# Patient Record
Sex: FEMALE | Race: WHITE
[De-identification: names, ages, dates, MRNs, and addresses within clinical notes are randomized per-mention and may not be internally consistent; named-entity substitution may affect disease eponyms.]

---

## 2017-07-30 ENCOUNTER — HOSPITAL ENCOUNTER (EMERGENCY)
Dept: HOSPITAL 41 - JD.ED | Age: 82
Discharge: HOME | End: 2017-07-30
Payer: MEDICARE

## 2017-07-30 VITALS — DIASTOLIC BLOOD PRESSURE: 86 MMHG | SYSTOLIC BLOOD PRESSURE: 170 MMHG

## 2017-07-30 DIAGNOSIS — E11.9: ICD-10-CM

## 2017-07-30 DIAGNOSIS — Z79.899: ICD-10-CM

## 2017-07-30 DIAGNOSIS — Z88.0: ICD-10-CM

## 2017-07-30 DIAGNOSIS — E66.9: ICD-10-CM

## 2017-07-30 DIAGNOSIS — Z79.84: ICD-10-CM

## 2017-07-30 DIAGNOSIS — R00.1: Primary | ICD-10-CM

## 2017-07-30 DIAGNOSIS — K21.9: ICD-10-CM

## 2017-07-30 DIAGNOSIS — Z88.5: ICD-10-CM

## 2017-07-30 DIAGNOSIS — I10: ICD-10-CM

## 2017-07-30 DIAGNOSIS — E78.00: ICD-10-CM

## 2017-07-30 DIAGNOSIS — F02.80: ICD-10-CM

## 2017-07-30 DIAGNOSIS — G30.1: ICD-10-CM

## 2017-07-30 DIAGNOSIS — E03.9: ICD-10-CM

## 2017-07-30 PROCEDURE — 93005 ELECTROCARDIOGRAM TRACING: CPT

## 2017-07-30 PROCEDURE — 96360 HYDRATION IV INFUSION INIT: CPT

## 2017-07-30 PROCEDURE — 99284 EMERGENCY DEPT VISIT MOD MDM: CPT

## 2017-07-30 PROCEDURE — P9612 CATHETERIZE FOR URINE SPEC: HCPCS

## 2017-07-30 PROCEDURE — 71020: CPT

## 2017-07-30 PROCEDURE — 84443 ASSAY THYROID STIM HORMONE: CPT

## 2017-07-30 PROCEDURE — 80053 COMPREHEN METABOLIC PANEL: CPT

## 2017-07-30 PROCEDURE — 85025 COMPLETE CBC W/AUTO DIFF WBC: CPT

## 2017-07-30 PROCEDURE — 86140 C-REACTIVE PROTEIN: CPT

## 2017-07-30 PROCEDURE — 51798 US URINE CAPACITY MEASURE: CPT

## 2017-07-30 PROCEDURE — 36415 COLL VENOUS BLD VENIPUNCTURE: CPT

## 2017-07-30 PROCEDURE — 96361 HYDRATE IV INFUSION ADD-ON: CPT

## 2017-07-30 PROCEDURE — 87040 BLOOD CULTURE FOR BACTERIA: CPT

## 2017-07-30 PROCEDURE — 81001 URINALYSIS AUTO W/SCOPE: CPT

## 2017-07-30 PROCEDURE — 84484 ASSAY OF TROPONIN QUANT: CPT

## 2017-07-31 NOTE — CR
Chest:  Two views of the chest were obtained.

 

Comparison: No previous study.

 

Right and left perihilar markings are slightly increased.  Difficult 

to exclude combination of atelectasis and bronchitis.  Lungs otherwise

 are clear.  Heart size is normal.  Tortuous thoracic aorta is seen.  

Surgical clips are seen along the right upper mediastinum.

 

Impression:

1.  Possible atelectasis and perihilar bronchitis.

2.  Surgical clips along the right side of the upper mediastinum.

3.  Other incidental findings.

 

Diagnostic code #3

## 2017-08-06 ENCOUNTER — HOSPITAL ENCOUNTER (EMERGENCY)
Dept: HOSPITAL 41 - JD.ED | Age: 82
Discharge: NURSING, REHAB OR LONG-TERM CARE | End: 2017-08-06
Payer: MEDICARE

## 2017-08-06 VITALS — SYSTOLIC BLOOD PRESSURE: 144 MMHG | DIASTOLIC BLOOD PRESSURE: 79 MMHG

## 2017-08-06 DIAGNOSIS — E66.9: ICD-10-CM
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
## 2017-08-06 DIAGNOSIS — I10: ICD-10-CM

## 2017-08-06 DIAGNOSIS — E03.9: ICD-10-CM

## 2017-08-06 DIAGNOSIS — E11.9: ICD-10-CM

## 2017-08-06 DIAGNOSIS — Z88.0: ICD-10-CM

## 2017-08-06 DIAGNOSIS — Z79.899: ICD-10-CM

## 2017-08-06 DIAGNOSIS — E78.00: ICD-10-CM

## 2017-08-06 DIAGNOSIS — K21.9: ICD-10-CM

## 2017-08-06 DIAGNOSIS — Z88.5: ICD-10-CM

## 2017-08-06 DIAGNOSIS — S01.01XA: Primary | ICD-10-CM

## 2017-08-06 NOTE — CT
Head CT

 

Technique: Multiple axial sections through the brain were obtained.  

Intravenous contrast was not utilized.

 

Comparison: No previous study.

 

Findings: Soft tissue hematoma is seen within the posterior left side 

of the scalp.

 

Ventricles along with basal cisterns and sulci over convexities are 

mildly prominent.  Diminished density is noted within the 

periventricular and subcortical white matter compatible with small 

vessel ischemic demyelination change.  No other abnormal parenchymal 

densities are seen.  No evidence of intracranial hemorrhage.  No 

midline shift or mass effect is seen.

 

Slight atherosclerotic change is noted within the vertebral vessels 

and within the carotid siphon.

 

Bone window settings were reviewed which shows no acute calvarial 

abnormality.  Visualized sinuses are clear.

 

Impression:

1.  Scalp hematoma within the posterior left scalp.

2.  Senescent change as described above.

3.  No acute intracranial abnormality is identified.

 

Diagnostic code #2

## 2017-08-06 NOTE — EDM.PDOC
ED HPI GENERAL MEDICAL PROBLEM





- General


Chief Complaint: Laceration


Stated Complaint: [facility]


Time Seen by Provider: 08/06/17 17:24


Source of Information: Reports: Patient, EMS


History Limitations: Reports: No Limitations





- History of Present Illness


INITIAL COMMENTS - FREE TEXT/NARRATIVE: 





81-year-old female presents from the [facility] by ambulance.


She had unwitnessed fall and was appreciated to have a laceration to her 

occipital scalp. Patient has no recollection of why when or where this 

occurred. She usually walks with aid of a walker. She denies any other 

injuries. Unclear when her last tetanus toxoid was.


Onset: Today


Onset Date: 08/06/17


Onset Time: 16:00


Duration: Minutes:


Location: Reports: Head (Has a 2 cm laceration occipital scalp.)


Quality: Reports: Other (Patient is asymptomatic. Patient has severe underlying 

organic brain disease and really had no recollection of how or where she fell 

or how she may have gotten hurt. She is not distressed by it at all.)


Severity: Moderate


Improves with: Reports: None


Worsens with: Reports: None


Context: Reports: Other (Unwitnessed fall in nursing home. Usually gets around 

with aid of a walker.)


Associated Symptoms: Reports: Confusion ( Has severe underlying dementia 

chronically from severe dementia )


Treatments PTA: Reports: Other (see below) (None.)





- Related Data


 Allergies











Allergy/AdvReac Type Severity Reaction Status Date / Time


 


atorvastatin Allergy  Other Verified 08/06/17 17:10


 


codeine Allergy  Other Verified 08/06/17 17:10


 


losartan Allergy  Other Verified 08/06/17 17:10


 


morphine Allergy  Other Verified 08/06/17 17:10


 


Penicillins Allergy  Other Verified 08/06/17 17:10











Home Meds: 


 Home Meds





Acetaminophen [Tylenol] 325 mg PO Q4H PRN 07/30/17 [History]


Betamethasone/Clotrimazole [Lotrisone] 1 applic TOP BID PRN 07/30/17 [History]


Bisacodyl [Dulcolax] 10 mg RECTAL DAILY PRN 07/30/17 [History]


Brimonidine Tartrate [Alphagan P] 1 drop EYELF BID 07/30/17 [History]


Calcium Carbonate [Tums] 200 mg PO ASDIRECTED PRN 07/30/17 [History]


Colesevelam HCl [Welchol] 3 tab PO BID 07/30/17 [History]


Compression Socks, Medium [Futuro Restoring] 1 applic TOP ASDIRECTED 07/30/17 [

History]


Dorzolamide [Trusopt 2% Ophth Soln] 10 ml EYELF BID 07/30/17 [History]


Edetate Disodium/PEG [Eyescrub Cleansing Pads] 1 each TP DAILY 07/30/17 [History

]


Glycerin/Propylene Glycol [Artificial Tears Drops] 1 drop OP QID 07/30/17 [

History]


Levothyroxine 137 mcg PO ACBREAKFAST 07/30/17 [History]


OLANZapine [Olanzapine] 7.5 mg PO DAILY 07/30/17 [History]


Polyethylene Glycol 3350 [MiraLAX] 17 gm PO DAILY 07/30/17 [History]


Sennosides [Senna] 8.6 mg PO DAILY PRN 07/30/17 [History]


Timolol Maleate [Timoptic 0.25% Ophth Soln] 5 ml EYELF BID 07/30/17 [History]


guaiFENesin/Dextromethorphan [Tussin DM Cough & Chest] 5 ml PO Q4H PRN 07/30/17 

[History]











Past Medical History


HEENT History: Reports: Glaucoma, Other (See Below)


Other HEENT History: dry eyes syndrome


Cardiovascular History: Reports: High Cholesterol, Hypertension


Gastrointestinal History: Reports: Chronic Constipation, GERD


Genitourinary History: Reports: Renal Disease, Other (See Below)


Other Genitourinary History: hx hyperglycemic--hyperosmolar coma ([facility])


Musculoskeletal History: Reports: Gout


Other Musculoskeletal History: uses walker


Neurological History: Reports: Alzheimers Disease, Other (See Below)


Other Neuro History: encephalopathy, AMS


Psychiatric History: Reports: Alzheimers Disease, Schizophrenia, Other (See 

Below)


Other Psychiatric History: delirium


Endocrine/Metabolic History: Reports: Diabetes, Type II, Hypothyroidism, Obesity

/BMI 30+





- Past Surgical History


Other Endocrine Surgeries/Procedures: thyroid surgery





Social & Family History





- Family History


Family Medical History: Noncontributory





- Tobacco Use


Smoking Status *Q: Unknown Ever Smoked


Second Hand Smoke Exposure: No





- Caffeine Use


Caffeine Use: Reports: Coffee





- Recreational Drug Use


Recreational Drug Use: No





- Living Situation & Occupation


Living situation: Reports: Extended Care Facility (Currently living in the 

[facility])


Occupation: Retired





ED ROS GENERAL





- Review of Systems


Review Of Systems: Unable To Obtain (Unable to obtain from the patient due to 

her underlying severe organic brain disease.)





ED EXAM, SKIN/RASH


Exam: See Below


Exam Limited By: Altered Mental Status (Severe dementia.)


General Appearance: Alert, No Apparent Distress


Eye Exam: Bilateral Eye: Normal Inspection, PERRL


Ears: Normal External Exam, Normal TMs


Nose: Normal Inspection


Throat/Mouth: Normal Inspection, Normal Oropharynx, Other.  No: Normal Teeth


Head: Other (No injuries to her tongue is a 2 cm stellate laceration to the 

superior aspect of the occipital scalp more towards the left than the right.)


Neck: Normal Inspection, Supple, Non-Tender, Full Range of Motion.  No: Carotid 

Bruit, Lymphadenopathy (L), Lymphadenopathy (R), Thyromegaly


Respiratory/Chest: No Respiratory Distress, Lungs Clear, Normal Breath Sounds, 

Chest Non-Tender


Cardiovascular: Regular Rate, Rhythm, No Edema, No Gallop, No Murmur, No Rub.  

No: Normal Peripheral Pulses


Peripheral Pulses: 2+: Posterior Tibial (L), Posterior Tibial (R), Dorsalis 

Pedis (L), Dorsalis Pedis (R)


GI/Abdominal: Normal Bowel Sounds, Soft, Non-Tender, No Organomegaly


Back Exam: Normal Inspection, Full Range of Motion, Other (No bruises 

contusions or abrasions identified. No vertebral tenderness identified.).  No: 

CVA Tenderness (L), CVA Tenderness (R)


Extremities: Normal Inspection, Normal Range of Motion, Non-Tender, No Pedal 

Edema, Other (No evidence of injuries to the wrists elbows or shoulders. No 

injuries to the hips or knees identified.)


Neurological: CN II-XII Intact, No Motor/Sensory Deficits.  No: Oriented, 

Normal Cognition, Normal Gait


Psychiatric: Normal Affect, Normal Mood


Skin: Warm, Dry, Intact, Normal Color, No Rash





ED SKIN PROCEDURES





- Laceration/Wound Repair


  ** Upper Posterior Occipital Head


Lac/Wound length In cm: 2.0


Appearance: Subcutaneous, Stellate


Distal NVT: Neuro & Vascular Intact


Anesthetic Type: Local


Local Anesthesia - Lidocaine (Xylocaine): 1% Plain


Local Anesthetic Volume: 2cc


Skin Prep: Saline


Closed with: Sutures


Suture Size: 4-0


# of Sutures: 4


Suture Type: Prolene, Interrupted, Simple





EKG INTERPRETATION


EKG Date: 08/06/17


Time: 18:45


Rhythm: NSR


Rate (Beats/Min): 85


Axis: Normal


P-Wave: Present


QRS: Other (Initial poor R-wave progression . Occasional multifocal PVCs 

appreciated)


ST-T: Normal (no signs of ischemia)


QT: Prolonged


Comparison: No Change (Minimally prolonged)





Course





- Vital Signs


Last Recorded V/S: 


 Last Vital Signs











Temp  36.3 C   08/06/17 17:03


 


Pulse  86   08/06/17 17:03


 


Resp  16   08/06/17 17:03


 


BP  144/79 H  08/06/17 17:03


 


Pulse Ox  95   08/06/17 17:03














- Orders/Labs/Meds


Orders: 


 Active Orders 24 hr











 Category Date Time Status


 


 EKG Documentation Completion [RC] STAT Care  08/06/17 17:31 Active











Labs: 


 Laboratory Tests











  08/06/17 08/06/17 Range/Units





  18:07 18:07 


 


WBC  9.97   (3.98-10.04)  K/mm3


 


RBC  4.08   (3.98-5.22)  M/mm3


 


Hgb  12.7   (11.2-15.7)  gm/L


 


Hct  37.5   (34.1-44.9)  %


 


MCV  91.9   (79.4-94.8)  fl


 


MCH  31.1   (25.6-32.2)  pg


 


MCHC  33.9   (32.2-35.5)  g/dl


 


RDW Std Deviation  46.5 H   (36.4-46.3)  fL


 


Plt Count  204   (182-369)  K/mm3


 


MPV  10.8   (9.4-12.3)  fl


 


Neutrophils % (Manual)  75 H   (40-60)  %


 


Band Neutrophils %  2   (0-10)  %


 


Lymphocytes % (Manual)  20   (20-40)  %


 


Atypical Lymphs %  0   %


 


Monocytes % (Manual)  2   (2-10)  %


 


Eosinophils % (Manual)  1   (0.7-5.8)  %


 


Basophils % (Manual)  0 L   (0.1-1.2)  


 


Platelet Estimate  Adequate   


 


Anisocytosis  1+ slight   


 


Ovalocytes  1+ slight   


 


RBC Morph Comment  Not Reportable   


 


Sodium   141  (136-145)  mEq/L


 


Potassium   4.1  (3.5-5.1)  mEq/L


 


Chloride   107  ()  mEq/L


 


Carbon Dioxide   26  (21-32)  mEq/L


 


Anion Gap   12.1  (5-15)  


 


BUN   23 H  (7-18)  mg/dL


 


Creatinine   1.4 H  (0.55-1.02)  mg/dL


 


Est Cr Clr Drug Dosing   29.32  mL/min


 


Estimated GFR (MDRD)   36  (>60)  mL/min


 


BUN/Creatinine Ratio   16.4  (14-18)  


 


Glucose   96  ()  mg/dL


 


Calcium   9.6  (8.5-10.1)  mg/dL


 


Total Bilirubin   0.7  (0.2-1.0)  mg/dL


 


AST   19  (15-37)  U/L


 


ALT   21  (14-59)  U/L


 


Alkaline Phosphatase   37 L  ()  U/L


 


CK-MB (CK-2)   0.8  (0-3.6)  ng/ml


 


Troponin I   < 0.017  (0.00-0.056)  ng/mL


 


Total Protein   6.8  (6.4-8.2)  g/dl


 


Albumin   3.6  (3.4-5.0)  g/dl


 


Globulin   3.2  gm/dL


 


Albumin/Globulin Ratio   1.1  (1-2)  











Meds: 


Medications














Discontinued Medications














Generic Name Dose Route Start Last Admin





  Trade Name Freq  PRN Reason Stop Dose Admin


 


Lidocaine HCl  10 ml  08/06/17 17:24  08/06/17 17:44





  Xylocaine 1%  INJECT  08/06/17 17:25  10 ml





  ONETIME ONE   Administration














- Radiology Interpretation


Free Text/Narrative:: 





81-year-old female brought to the ED per [place]  [facility] after she 

experienced an unwitnessed fall at the [facility]. She was 

identified to have hit her head and suffered a laceration to the occipital 

scalp. She usually walks on her own volition with aid of a walker. Again the 

details of where and how she fell or unclear. She did not appear to be in any 

distress upon arrival. Patient is not does have severe organic brain disease.





- Re-Assessments/Exams


Free Text/Narrative Re-Assessment/Exam: 





08/06/17 19:30: CT of the head reveals diminished density within the 

periventricular and subcortical white matter compatible with small vessel 

ischemic changes. No other parenchymal abnormalities were identified. There is 

no evidence of intracranial hemorrhage or fracture. There is slight 

atherosclerotic changes noted within the vertebral vessels and within the 

carotid siphon. Scalp hematoma appreciated within the posterior left scalp.





08/06/17 20:30:  Laceration was repaired occipital scalp using 4-0 Prolene 4 

sutures. Wound will be cleansed daily with soap and water at the care center. 

Sutures will need to be removed in 10 days' time. She was discharged home in 

the care of her family.








Departure





- Departure


Time of Disposition: 20:36


Disposition: DC/Tfer to [facility]


Condition: Fair


Clinical Impression: 


Occipital scalp laceration


Qualifiers:


 Encounter type: initial encounter Qualified Code(s): S01.01XA - Laceration 

without foreign body of scalp, initial encounter





Fall as cause of accidental injury at home as place of occurrence


Qualifiers:


 Encounter type: initial encounter Qualified Code(s): W19.XXXA - Unspecified 

fall, initial encounter








- Discharge Information


Instructions:  Laceration Care, Adult


Referrals: 


Alli Winston MD [Primary Care Provider] - 


Forms:  ED Department Discharge


Additional Instructions: 


Evaluation the emergency room today after a fall at home that was unwitnessed. 

Obvious injury to the occipital scalp with a 2 cm laceration to the superior 

occipital scalp identified. CT of the brain was carried out and shows marked 

degenerative changes with small vessel ischemic changes but no signs of stroke 

or intracranial bleeding or mid line shift or mass effect. Lab work was done 

and also was all within normal limits. There was no metabolic abnormalities 

that would cause muscle weakness and or fall. Laceration was repaired with 4 4-

0 Prolene sutures. Treatment at home is daily cleanse the wound with soap and 

water showering is okay. Then apply topical anabolic such as Polysporin or 

bacitracin once daily. Sutures need to be removed in 10 days' time.





- My Orders


Last 24 Hours: 


My Active Orders





08/06/17 17:31


EKG Documentation Completion [RC] STAT 














- Assessment/Plan


Last 24 Hours: 


My Active Orders





08/06/17 17:31


EKG Documentation Completion [RC] STAT

## 2017-08-29 ENCOUNTER — HOSPITAL ENCOUNTER (EMERGENCY)
Dept: HOSPITAL 41 - JD.ED | Age: 82
Discharge: HOME | End: 2017-08-29
Payer: MEDICARE

## 2017-08-29 VITALS — DIASTOLIC BLOOD PRESSURE: 57 MMHG | SYSTOLIC BLOOD PRESSURE: 105 MMHG

## 2017-08-29 DIAGNOSIS — E11.9: ICD-10-CM

## 2017-08-29 DIAGNOSIS — E78.00: ICD-10-CM

## 2017-08-29 DIAGNOSIS — Z79.84: ICD-10-CM

## 2017-08-29 DIAGNOSIS — E66.9: ICD-10-CM

## 2017-08-29 DIAGNOSIS — Z88.8: ICD-10-CM

## 2017-08-29 DIAGNOSIS — I10: ICD-10-CM

## 2017-08-29 DIAGNOSIS — E03.9: ICD-10-CM

## 2017-08-29 DIAGNOSIS — Z88.0: ICD-10-CM

## 2017-08-29 DIAGNOSIS — G30.9: ICD-10-CM

## 2017-08-29 DIAGNOSIS — F02.80: ICD-10-CM

## 2017-08-29 DIAGNOSIS — Z79.899: ICD-10-CM

## 2017-08-29 DIAGNOSIS — Z88.5: ICD-10-CM

## 2017-08-29 DIAGNOSIS — S02.2XXA: Primary | ICD-10-CM

## 2017-08-29 NOTE — CT
CT cervical spine

 

Technique: Multiple axial sections were obtained from above C1 

inferiorly to the top of T2.  Reconstructed sagittal and coronal 

images were reviewed.

 

Comparison: No previous cervical spine imaging.

 

Findings: Mastoid sinuses that are seen appear clear.  Middle ear 

cavities are also clear.  Posterior skull base is intact.

 

Degenerative change is noted between the dens and anterior arch of C1.

  Mild disc space narrowing is noted at C4-C5 and C5-C6 with posterior

 spurring seen at C5-C6.  Anterior osteophytes are noted at C3-C4, 

C4-C5, C5-C6 and C6-C7.  Mild scattered degenerative change is seen 

within the apophyseal joints.  Mild right-sided neural foraminal 

stenosis is noted at C4-C5.  Moderate to severe bilateral neural 

foraminal stenosis is noted at C5-C6.  Bony defect is seen within the 

posterior lamina at C6 presumably due to previous surgery.  Surgical 

clips are seen at the base of the right neck.  Bony central canal 

stenosis is seen.

 

No acute fracture is appreciated.  No abnormal subluxation is seen.

 

Slight degenerative spurring is noted within the uncovertebral joints 

at C4-C5 and C5-C6.

 

Impression:

1.  Degenerative change as noted above.

2.  Defect within the posterior lamina of C6 on the right side most 

likely due to previous cervical spine surgery.  Please correlate.

3.  Nothing acute is identified on CT study of the cervical spine.

 

Diagnostic code #2

## 2017-08-29 NOTE — EDM.PDOC
ED HPI GENERAL MEDICAL PROBLEM





- General


Chief Complaint: Trauma


Stated Complaint: [facility]


Time Seen by Provider: 08/29/17 07:20


Source of Information: Reports: Patient, EMS, Nursing Home Records


History Limitations: Reports: Altered Mental Status ( hasdementia.)





- History of Present Illness


INITIAL COMMENTS - FREE TEXT/NARRATIVE: 





81-year-old female presents to the ED for evaluation of a fall that was 

unwitnessed. She was found on the floor of her room this morning. It's unclear 

by history whether she walks on her own volition are usually uses a walker or 

wheelchair. She has dementia and was not able to answer all questions. She 

denies much in the way of pain other than in her nose. Nose is obviously very 

swollen and suspect to be fractured. No active bleeding is occurring at this 

time. She has a slight abrasion to her right forehead above the eyebrow. She's 

had previous cervical spine surgery but seems to have full range of motion of 

her neck.


Onset: Today, Unknown/Unsure


Duration: Hour(s): (Unsure what time she may have fallen she was found the 

floor per room this morning.)


Location: Reports: Head, Face, Neck


Quality: Reports: Other


Severity: Moderate (She does not complain of any pain or discomfort.)


Improves with: Reports: None


Worsens with: Reports: None


Context: Reports: Trauma


Associated Symptoms: Reports: No Other Symptoms, Confusion (Chronic confusion 

due to dementia.), Other (Patient answers negative to all questions in regards 

to injuries she may have sustained.)


Treatments PTA: Reports: Other (see below) (None.)





- Related Data


 Allergies











Allergy/AdvReac Type Severity Reaction Status Date / Time


 


atorvastatin Allergy  Other Verified 08/29/17 07:20


 


codeine Allergy  Other Verified 08/29/17 07:20


 


losartan Allergy  Other Verified 08/29/17 07:20


 


morphine Allergy  Other Verified 08/29/17 07:20


 


Penicillins Allergy  Other Verified 08/29/17 07:20











Home Meds: 


 Home Meds





Acetaminophen [Tylenol] 325 mg PO Q4H PRN 07/30/17 [History]


Calcium Carbonate [Tums] 200 mg PO ASDIRECTED PRN 07/30/17 [History]


Levothyroxine 150 mcg PO ACBREAKFAST 07/30/17 [History]


Sennosides [Senna] 8.6 mg PO DAILY PRN 07/30/17 [History]


Timolol Maleate [Timoptic 0.25% Ophth Soln] 1 drop EYELF BID 07/30/17 [History]


Bisacodyl [Dulcolax] 10 mg RECTAL ASDIRECTED PRN 08/29/17 [History]


Brimonidine Tartrate [Alphagan P 0.1% Ophth Soln] 1 drop EYEBOTH BID 08/29/17 [

History]


[place]/Min Oil/Desire/Wool Alcoh [Eucerin Creme] 1 applic EARBOTH BID PRN 08/29/ 17 [History]


Colesevelam [Welchol] 3 tab PO BID 08/29/17 [History]


Dextran 70/Hypromellose [Artificial Tears] 1 drop EYEBOTH QID 08/29/17 [History]


Dorzolamide [Trusopt 2% Ophth Soln] 1 drop EYELF BID 08/29/17 [History]


Latanoprost [Xalatan 0.005% Ophth Soln] 1 drop EYELF BEDTIME 08/29/17 [History]


Metoprolol Tartrate [Lopressor] 50 mg PO BID 08/29/17 [History]


OLANZapine [Olanzapine] 15 mg PO BEDTIME 08/29/17 [History]


guaiFENesin/Dextromethorphan [Safetussin DM] 5 ml PO Q4H PRN 08/29/17 [History]


metFORMIN [Glucophage] 500 mg PO BID 08/29/17 [History]











Past Medical History


HEENT History: Reports: Glaucoma, Other (See Below)


Other HEENT History: dry eyes syndrome


Cardiovascular History: Reports: High Cholesterol, Hypertension


Gastrointestinal History: Reports: Chronic Constipation, GERD


Genitourinary History: Reports: Renal Disease, Other (See Below)


Other Genitourinary History: hx hyperglycemic--hyperosmolar coma ([facility])


Musculoskeletal History: Reports: Gout


Other Musculoskeletal History: uses walker


Neurological History: Reports: Alzheimers Disease, Other (See Below)


Other Neuro History: encephalopathy, AMS


Psychiatric History: Reports: Alzheimers Disease, Schizophrenia, Other (See 

Below)


Other Psychiatric History: delirium


Endocrine/Metabolic History: Reports: Diabetes, Type II, Hypothyroidism, Obesity

/BMI 30+





- Past Surgical History


Other Endocrine Surgeries/Procedures: thyroid surgery





Social & Family History





- Family History


Family Medical History: Noncontributory





- Tobacco Use


Smoking Status *Q: Unknown Ever Smoked


Second Hand Smoke Exposure: No





- Caffeine Use


Caffeine Use: Reports: Coffee





- Recreational Drug Use


Recreational Drug Use: No





- Living Situation & Occupation


Living situation: Reports: Extended Care Facility (Currently living in the 

[facility])


Occupation: Retired





Review of Systems





- Review of Systems


Review Of Systems: Unable To Obtain (Due to her dementia.)





ED EXAM, GENERAL





- Physical Exam


Exam: See Below


Exam Limited By: Altered Mental Status (Organic brain disease with a few verbal 

comments but not does not answer most questions.)


General Appearance: No Apparent Distress (Appears somewhat tired.)


Eye Exam: Bilateral Eye: Normal Inspection, PERRL


Ears: Normal External Exam, Normal TMs


Nose: Other (The nose is markedly swollen worse a little bit on the right side 

as compared to the left. There is dried blood in both nasal canals. Due to her 

positioning was difficult to ascertain full inspection of the right naris and 

it will be repeated after she gets back from CT. Wish to be certain that there 

is no nasal septal hematoma. Active bleeding is occurring at this time.)


Throat/Mouth: Normal Inspection, Normal Lips, Normal Oropharynx, Other


Head: Atraumatic, Normocephalic (Wearing dentures and there is no blood in the 

posterior oropharynx.), Other (Slight a superficial scratch above her right 

eyebrow forehead area.)


Neck: Normal Inspection, Non-Tender, Full Range of Motion, Other (Well-healed 

midline scar over the lower cervical spine and upper thoracic spine.)


Respiratory/Chest: No Respiratory Distress, Lungs Clear, Normal Breath Sounds, 

No Accessory Muscle Use, Decreased Breath Sounds (Does not take very good deep 

breath. Decreased breath sounds lower 30% of lung fields.), Other


Cardiovascular: Normal Peripheral Pulses (No obvious injuries to the posterior 

or anterior thorax ribs or sternum. Clavicles are intact.), Regular Rate, Rhythm

, No Gallop, No Murmur, No Rub


Peripheral Pulses: 1+: Posterior Tibial (L), Posterior Tibial (R), Dorsalis 

Pedis (L), Dorsalis Pedis (R)


GI/Abdominal: Normal Bowel Sounds, Soft, Non-Tender, No Organomegaly


Back Exam: Normal Inspection, Full Range of Motion.  No: CVA Tenderness (L), 

CVA Tenderness (R)


Extremities: Normal Inspection, Normal Range of Motion, Non-Tender, Normal 

Capillary Refill, Other (There is evidence of osteophytic changes in both knees 

but full range of motion appreciated no contusions or abrasions to the knees)


Neurological: No Motor/Sensory Deficits.  No: Oriented, CN II-XII Intact, 

Normal Cognition


Psychiatric: Flat Affect


Skin Exam: Warm, Dry, Intact, Normal Color, No Rash





Course





- Vital Signs


Last Recorded V/S: 


 Last Vital Signs











Temp  36.5 C   08/29/17 07:18


 


Pulse  70   08/29/17 07:18


 


Resp  16   08/29/17 07:18


 


BP  127/75   08/29/17 07:18


 


Pulse Ox  98   08/29/17 07:18














- Orders/Labs/Meds


Orders: 


 Active Orders 24 hr











 Category Date Time Status


 


 Cervical Spine wo Cont [CT] Stat Exams  08/29/17 07:30 Taken


 


 Maxillofacial w/o CM [Max Facial Sinus wo Cont] [CT] Exams  08/29/17 07:27 

Taken





 Stat   














- Radiology Interpretation


Free Text/Narrative:: 





81-year-old female presents to the ED after sustaining a fall sometime during 

the night and [facility]. She has obvious facial injuries 

particularly to her nose. No other injuries were identified on complete exam. 

She is in sinus rhythm. She is not appear to be in failure. Plan maxillofacial 

CT to be done. There are no open wounds that would demand tetanus toxoid at 

this time.





- Re-Assessments/Exams


Free Text/Narrative Re-Assessment/Exam: 





08/29/17 08:33 CT of the head reveals no intracranial bleeding or mass effect. 

There is marked dementia changes with small vessel ischemic changes in both 

basal ganglia. Diminished density noted within portions of the periventricular 

white matter and subcortical white matter compared compatible with small vessel 

ischemic demyelination changes. Soft tissue swelling noted of the upper left 

parietal scalp. This was seen on prior study but is diminished in amount. She 

does have mild diffuse sinus disease slightly worsened from previous 

examination. CT cervical spine reveals advanced degenerative changes with no 

fractures identified. There are osteophytes both posterior and anteriorly with 

degenerative disc disease. CT of the maxillofacial bones reveals a fracture of 

the nasal septum and vomer. There is a deviation of the nasal septum to the 

right side. No other facial fractures are identified. This will be treated 

conservatively.








Departure





- Departure


Time of Disposition: 08:35


Disposition: Home, Self-Care 01


Condition: Fair


Clinical Impression: 


 Fracture of nasal bones, initial encounter for closed fracture





Fall as cause of accidental injury at home as place of occurrence


Qualifiers:


 Encounter type: initial encounter Qualified Code(s): W19.XXXA - Unspecified 

fall, initial encounter








- Discharge Information


Referrals: 


Alli Winston MD [Primary Care Provider] - 


Forms:  ED Department Discharge


Additional Instructions: 


Evaluation in the emergency department today in regards to injuries sustained 

from a fall sometime during the night at [facility]. 

Injuries are mostly to her face with marked swelling of the nose. CT of the 

fact maxillofacial bones reveals a fracture of the nasal spine with minimal 

displacement and a fracture of the vomer with deviation of the nasal septum to 

the right side. No septal hematomas evident. No active bleeding at the time of 

examination. There is some mild blood in the maxillary sinuses. CT of the head 

reveals a soft tissue mass left parietal scalp which appears to be resolving 

compared to last CT. No intracranial bleeding or fractures were identified. No 

midline shift or mass effect. CT of the cervical spine reveals diffuse 

degenerative changes throughout the cervical spine but again no fractures were 

identified. Cervidil. Ice pack to the nose if she will allow it for 20 minutes 

out of every 4 hours today and tomorrow. Tylenol 650 mg every 6 hours as needed 

for pain relief. No surgical management is required at this time.





- My Orders


Last 24 Hours: 


My Active Orders





08/29/17 07:27


Maxillofacial w/o CM [Max Facial Sinus wo Cont] [CT] Stat 





08/29/17 07:30


Cervical Spine wo Cont [CT] Stat 














- Assessment/Plan


Last 24 Hours: 


My Active Orders





08/29/17 07:27


Maxillofacial w/o CM [Max Facial Sinus wo Cont] [CT] Stat 





08/29/17 07:30


Cervical Spine wo Cont [CT] Stat

## 2017-08-29 NOTE — CT
CT facial bones

 

Technique: Multiple axial sections were obtained through the facial 

bones.  Reconstructed sagittal and coronal images were reviewed.

 

Comparison: No previous facial bone study.

 

Findings: Minimal mucosal thickening is identified within the left 

maxillary and ethmoid sinuses.  Minimal mucosal thickening is seen 

within the frontal sinuses.  Small calcification is noted within the 

left and right orbits which is felt to be incidental.  Mild 

degenerative change is seen within the temporomandibular joints.  

Questionable fracture within the anterior left side of the nasal bone.

  No additional fracture is appreciated.

 

Impression:

1.  Minimal sinus findings.  Slight degenerative change.

2.  Questionable minimal nasal bone fracture.  Please correlate if 

patient is symptomatic to this area.

3.  No other acute abnormality is appreciated.

 

Diagnostic code #3

## 2017-08-29 NOTE — CT
Head CT

 

Technique: Multiple axial sections through the brain were obtained.  

Intravenous contrast was not utilized.

 

Comparison: Previous head CT exam of 08/06/17.

 

Findings: Ventricles along with basal cisterns and sulci over the 

convexities are mildly prominent.  Mild atherosclerotic change is seen

 within the vertebral vessels and within the carotid siphon.  Minimal 

diminished density is noted within portions of the periventricular 

white matter and subcortical white matter compatible with small vessel

 ischemic demyelination change.  No evidence of intracranial 

hemorrhage.  No midline shift or mass effect is seen.

 

Soft tissue swelling noted within the upper left parietal scalp.  This

 is seen on prior study but has diminished in amount.

 

No acute calvarial abnormality is seen.  Mild mucosal thickening is 

noted within the ethmoid sinuses and left maxillary sinus.

 

Impression:

1.  Minimal sinus disease slightly increased from prior exam.

2.  Senescent change as described above.

3.  Diminished soft tissue swelling within the upper left parietal 

scalp from prior study.

4.  Nothing acute is appreciated on noncontrast head CT study.

 

Diagnostic code #2

## 2017-09-08 ENCOUNTER — HOSPITAL ENCOUNTER (EMERGENCY)
Dept: HOSPITAL 41 - JD.ED | Age: 82
Discharge: HOME | End: 2017-09-08
Payer: MEDICARE

## 2017-09-08 VITALS — SYSTOLIC BLOOD PRESSURE: 139 MMHG | DIASTOLIC BLOOD PRESSURE: 72 MMHG

## 2017-09-08 DIAGNOSIS — Z79.84: ICD-10-CM

## 2017-09-08 DIAGNOSIS — Z88.5: ICD-10-CM
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
## 2017-09-08 DIAGNOSIS — Z79.899: ICD-10-CM

## 2017-09-08 DIAGNOSIS — Z88.0: ICD-10-CM

## 2017-09-08 DIAGNOSIS — S01.01XA: Primary | ICD-10-CM

## 2017-09-08 PROCEDURE — 90471 IMMUNIZATION ADMIN: CPT

## 2017-09-08 PROCEDURE — 90715 TDAP VACCINE 7 YRS/> IM: CPT

## 2017-09-08 PROCEDURE — 99285 EMERGENCY DEPT VISIT HI MDM: CPT

## 2017-09-08 PROCEDURE — 12002 RPR S/N/AX/GEN/TRNK2.6-7.5CM: CPT

## 2017-09-08 PROCEDURE — 70450 CT HEAD/BRAIN W/O DYE: CPT

## 2017-09-08 NOTE — EDM.PDOC
ED HPI GENERAL MEDICAL PROBLEM





- General


Chief Complaint: Laceration


Stated Complaint: [facility]


Time Seen by Provider: 09/08/17 15:46


Source of Information: Reports: EMS, Nursing Home Records


History Limitations: Reports: No Limitations





- History of Present Illness


INITIAL COMMENTS - FREE TEXT/NARRATIVE: 





The patient is a resident of [facility] and she fell and hit the 

back of her head.  She has a laceration.  She had no LOC.  It appears she 

tripped and fell and did not pass out.  She has no other complaints.  She has 

dementia and she has been getting worse.  The psychiatrist was there yesterday 

to make some changes.  She has fallen 3 times this past month.  She will follow 

commands.  It appears she has no other injuries.


Onset: Sudden


Duration: Minutes:


Location: Reports: Head (Back)


Improves with: Reports: None


Worsens with: Reports: None


Associated Symptoms: Reports: No Other Symptoms





- Related Data


 Allergies











Allergy/AdvReac Type Severity Reaction Status Date / Time


 


atorvastatin Allergy  Other Verified 09/08/17 15:56


 


codeine Allergy  Other Verified 09/08/17 15:56


 


losartan Allergy  Other Verified 09/08/17 15:56


 


morphine Allergy  Other Verified 09/08/17 15:56


 


Penicillins Allergy  Other Verified 09/08/17 15:56











Home Meds: 


 Home Meds





Acetaminophen [Tylenol] 650 mg PO Q6H PRN 07/30/17 [History]


Calcium Carbonate [Tums] 200 mg PO ASDIRECTED PRN 07/30/17 [History]


Levothyroxine 150 mcg PO ACBREAKFAST 07/30/17 [History]


Sennosides [Senna] 8.6 mg PO DAILY PRN 07/30/17 [History]


Timolol Maleate [Timoptic 0.25% Ophth Soln] 1 drop EYELF BID 07/30/17 [History]


Bisacodyl [Dulcolax] 10 mg RECTAL ASDIRECTED PRN 08/29/17 [History]


Brimonidine Tartrate [Alphagan P 0.1% Ophth Soln] 1 drop EYEBOTH BID 08/29/17 [

History]


[place]/Min Oil/Desire/Wool Alcoh [Eucerin Creme] 1 applic EARBOTH BID PRN 08/29/ 17 [History]


Colesevelam [Welchol] 3 tab PO BID 08/29/17 [History]


Dextran 70/Hypromellose [Artificial Tears] 1 drop EYEBOTH QID 08/29/17 [History]


Dorzolamide [Trusopt 2% Ophth Soln] 1 drop EYELF BID 08/29/17 [History]


Latanoprost [Xalatan 0.005% Ophth Soln] 1 drop EYELF BEDTIME 08/29/17 [History]


Metoprolol Tartrate [Lopressor] 50 mg PO BID 08/29/17 [History]


OLANZapine [Olanzapine] 15 mg PO BEDTIME 08/29/17 [History]


guaiFENesin/Dextromethorphan [Safetussin DM] 5 ml PO Q4H PRN 08/29/17 [History]


metFORMIN [Glucophage] 500 mg PO BID 08/29/17 [History]


Betamethasone/Clotrimazole [Lotrisone] 1 appful TOP BID 09/08/17 [History]


Memantine HCl [Namenda XR] 1 tab PO DAILY 09/08/17 [History]











Past Medical History


HEENT History: Reports: Glaucoma, Other (See Below)


Other HEENT History: dry eyes syndrome


Cardiovascular History: Reports: High Cholesterol, Hypertension


Gastrointestinal History: Reports: Chronic Constipation, GERD


Genitourinary History: Reports: Renal Disease, Other (See Below)


Other Genitourinary History: hx hyperglycemic--hyperosmolar coma ([facility])


OB/GYN History: Reports: Pregnancy


Musculoskeletal History: Reports: Gout


Other Musculoskeletal History: uses walker


Neurological History: Reports: Alzheimers Disease, Other (See Below)


Other Neuro History: encephalopathy, AMS


Psychiatric History: Reports: Alzheimers Disease, Schizophrenia, Other (See 

Below)


Other Psychiatric History: delirium


Endocrine/Metabolic History: Reports: Diabetes, Type II, Hypothyroidism, Obesity

/BMI 30+





- Past Surgical History


Other Endocrine Surgeries/Procedures: thyroid surgery





Social & Family History





- Family History


Family Medical History: Noncontributory





- Tobacco Use


Smoking Status *Q: Unknown Ever Smoked


Second Hand Smoke Exposure: No





- Caffeine Use


Caffeine Use: Reports: None





- Recreational Drug Use


Recreational Drug Use: No





- Living Situation & Occupation


Living situation: Reports: Extended Care Facility (Currently living in the 

[facility])


Occupation: Retired





ED ROS GENERAL





- Review of Systems


Review Of Systems: See Below


Constitutional: Reports: No Symptoms


HEENT: Reports: Other (laceration to the back of her head)


Respiratory: Reports: No Symptoms


Cardiovascular: Reports: No Symptoms


Endocrine: Reports: No Symptoms


GI/Abdominal: Reports: No Symptoms


: Reports: No Symptoms


Musculoskeletal: Reports: No Symptoms


Skin: Reports: No Symptoms


Neurological: Reports: No Symptoms





ED EXAM, SKIN/RASH


Exam: See Below


Exam Limited By: No Limitations


General Appearance: Alert, No Apparent Distress


Ears: Normal External Exam


Nose: Normal Inspection


Head: Other (5cm laceration to the occipital region of the scalp)


Neck: Normal Inspection, Non-Tender


Respiratory/Chest: No Respiratory Distress, Lungs Clear, Normal Breath Sounds


Cardiovascular: Regular Rate, Rhythm, No Edema, No Murmur


GI/Abdominal: Soft, Non-Tender, No Organomegaly, No Mass


Back Exam: Normal Inspection


Extremities: Normal Inspection


Neurological: Alert, Oriented, No Motor/Sensory Deficits





ED SKIN PROCEDURES





- Laceration/Wound Repair


  ** Posterior Head


Lac/Wound length In cm: 5


Appearance: Subcutaneous, Linear


Anesthetic Type: Topical (LET)


Skin Prep: Saline


Exploration/Debridement/Repair: Wound Explored, In a Bloodless Field, Explored 

to Base


Closed with: Staples


# of Sutures: 7


Tetanus Status Addressed: Yes


Complications: No





Course





- Vital Signs


Last Recorded V/S: 


 Last Vital Signs











Temp  98.3 F   09/08/17 15:52


 


Pulse  78   09/08/17 15:52


 


Resp  22 H  09/08/17 15:52


 


BP  139/72   09/08/17 15:52


 


Pulse Ox  97   09/08/17 15:52














- Orders/Labs/Meds


Orders: 


 Active Orders 24 hr











 Category Date Time Status


 


 Vaccines to be Administered [RC] PER UNIT ROUTINE Care  09/08/17 15:57 Active











Meds: 


Medications














Discontinued Medications














Generic Name Dose Route Start Last Admin





  Trade Name Flora  PRN Reason Stop Dose Admin


 


Diphtheria/Tetanus/Acell Pertussis  0.5 ml  09/08/17 15:57  09/08/17 16:48





  Adacel  IM  09/08/17 15:58  0.5 ml





  .ONCE ONE   Administration


 


Lidocaine/Tetracaine  1 ml  09/08/17 15:55  09/08/17 16:38





  Let Soln  TOP  09/08/17 15:56  1 ml





  ONETIME ONE   Administration


 


Lidocaine/Tetracaine  Confirm  09/08/17 16:00  09/08/17 16:39





  Let Soln  Administered  09/08/17 16:01  Not Given





  Dose   





  1 ml   





  .ROUTE   





  .ST-MED ONE   














- Re-Assessments/Exams


Free Text/Narrative Re-Assessment/Exam: 





09/08/17 16:33


I ordered an CT of her head and LET to her wound and I will put some staples in 

it.


09/08/17 16:57


The CT of her head was negative for fracture of bleed.  I stapled her head.  We 

updated her tetanus.





Departure





- Departure


Time of Disposition: 17:00


Disposition: Home, Self-Care 01


Condition: Good


Clinical Impression: 


Fall


Qualifiers:


 Encounter type: initial encounter Qualified Code(s): W19.XXXA - Unspecified 

fall, initial encounter





Laceration of scalp


Qualifiers:


 Encounter type: initial encounter Qualified Code(s): S01.01XA - Laceration 

without foreign body of scalp, initial encounter








- Discharge Information


Forms:  ED Department Discharge


Additional Instructions: 


Clean the laceration with warm soapy water 2 times per day and apply antibiotic 

ointment after.  The staples can come out in 1 week.  Look for any signs of 

infection such as redness, swelling, pain or drainage.





- My Orders


Last 24 Hours: 


My Active Orders





09/08/17 15:57


Vaccines to be Administered [RC] PER UNIT ROUTINE 














- Assessment/Plan


Last 24 Hours: 


My Active Orders





09/08/17 15:57


Vaccines to be Administered [RC] PER UNIT ROUTINE

## 2017-09-08 NOTE — CT
Head CT

 

Technique: Multiple axial sections of the brain were obtained.  

Intravenous contrast was not utilized.

 

Comparison: Previous head CT exam of 08/29/17.

 

Findings: Ventricles along with basal cisterns and sulci over the 

convexities are mildly prominent.  Minimal diminished density is noted

 within the periventricular white matter and subcortical white matter 

compatible with small vessel ischemic demyelination change.  No other 

abnormal parenchymal densities are seen.  No evidence of intracranial 

hemorrhage.  No midline shift or mass effect is seen.  Visualized 

sinuses are clear.  No acute calvarial abnormality is appreciated.  

Mild persistent soft tissue swelling is seen within the posterior left

 scalp which is seen on prior exam.

 

Impression:

1.  Senescent change and other incidental findings.  Nothing acute is 

appreciated on noncontrast head CT study.

 

Diagnostic code #2

## 2017-11-11 ENCOUNTER — HOSPITAL ENCOUNTER (EMERGENCY)
Dept: HOSPITAL 41 - JD.ED | Age: 82
Discharge: HOME | End: 2017-11-11
Payer: MEDICARE

## 2017-11-11 VITALS — SYSTOLIC BLOOD PRESSURE: 152 MMHG | DIASTOLIC BLOOD PRESSURE: 78 MMHG

## 2017-11-11 DIAGNOSIS — Z88.8: ICD-10-CM

## 2017-11-11 DIAGNOSIS — G30.1: Primary | ICD-10-CM

## 2017-11-11 DIAGNOSIS — Z88.5: ICD-10-CM

## 2017-11-11 DIAGNOSIS — E78.00: ICD-10-CM

## 2017-11-11 DIAGNOSIS — K21.9: ICD-10-CM

## 2017-11-11 DIAGNOSIS — E11.9: ICD-10-CM

## 2017-11-11 DIAGNOSIS — Z79.84: ICD-10-CM

## 2017-11-11 DIAGNOSIS — I10: ICD-10-CM

## 2017-11-11 DIAGNOSIS — Z88.0: ICD-10-CM

## 2017-11-11 DIAGNOSIS — E03.9: ICD-10-CM

## 2017-11-11 DIAGNOSIS — Z79.899: ICD-10-CM

## 2017-11-11 DIAGNOSIS — F02.81: ICD-10-CM

## 2017-11-11 PROCEDURE — 93005 ELECTROCARDIOGRAM TRACING: CPT

## 2017-11-11 PROCEDURE — P9612 CATHETERIZE FOR URINE SPEC: HCPCS

## 2017-11-11 PROCEDURE — 85025 COMPLETE CBC W/AUTO DIFF WBC: CPT

## 2017-11-11 PROCEDURE — 80053 COMPREHEN METABOLIC PANEL: CPT

## 2017-11-11 PROCEDURE — 51702 INSERT TEMP BLADDER CATH: CPT

## 2017-11-11 PROCEDURE — 99285 EMERGENCY DEPT VISIT HI MDM: CPT

## 2017-11-11 PROCEDURE — 81001 URINALYSIS AUTO W/SCOPE: CPT

## 2017-11-11 PROCEDURE — 36415 COLL VENOUS BLD VENIPUNCTURE: CPT

## 2017-11-11 PROCEDURE — 71010: CPT

## 2017-11-11 PROCEDURE — 70450 CT HEAD/BRAIN W/O DYE: CPT

## 2017-11-11 PROCEDURE — 83880 ASSAY OF NATRIURETIC PEPTIDE: CPT

## 2017-11-11 NOTE — EDM.PDOC
ED HPI GENERAL MEDICAL PROBLEM





- General


Chief Complaint: Neurological Problem


Stated Complaint: [facility]


Time Seen by Provider: 11/11/17 12:25


Source of Information: Reports: Patient, RN Notes Reviewed





- History of Present Illness


INITIAL COMMENTS - FREE TEXT/NARRATIVE: 





82-year-old female has been brought here by [facility] for evaluation 

of generalized weakness, balance difficulty. She is a nursing home patient at 

the [facility].  She does have history of moderately advanced 

dementia in addition to her other known medical problems. According to transfer 

records she appear to have been having some balance difficulty "crashing into 

things while ambulating with her walker. They felt that she was more unsteady 

and "out of control" than usual. There was  no report of fall or injury. They 

did place her bed and she was transferred for here without further incident. On 

Arrival to ED she does deny chest, abdominal or other discomfort. She continues 

to remain afebrile.





- Related Data


 Allergies











Allergy/AdvReac Type Severity Reaction Status Date / Time


 


atorvastatin Allergy  Other Verified 09/08/17 15:56


 


codeine Allergy  Other Verified 09/08/17 15:56


 


losartan Allergy  Other Verified 09/08/17 15:56


 


morphine Allergy  Other Verified 09/08/17 15:56


 


Penicillins Allergy  Other Verified 09/08/17 15:56











Home Meds: 


 Home Meds





Acetaminophen [Tylenol] 650 mg PO Q6H PRN 07/30/17 [History]


Calcium Carbonate [Tums] 200 mg PO ASDIRECTED PRN 07/30/17 [History]


Levothyroxine 150 mcg PO ACBREAKFAST 07/30/17 [History]


Sennosides [Senna] 8.6 mg PO DAILY PRN 07/30/17 [History]


Timolol Maleate [Timoptic 0.25% Ophth Soln] 1 drop EYELF BID 07/30/17 [History]


Bisacodyl [Dulcolax] 10 mg RECTAL ASDIRECTED PRN 08/29/17 [History]


Brimonidine Tartrate [Alphagan P 0.1% Ophth Soln] 1 drop EYEBOTH BID 08/29/17 [

History]


[place]/Min Oil/Desire/[place]ool Alcoh [Eucerin Creme] 1 applic EARBOTH BID PRN 08/29/ 17 [History]


Colesevelam [Welchol] 3 tab PO BID 08/29/17 [History]


Dextran 70/Hypromellose [Artificial Tears] 1 drop EYEBOTH QID 08/29/17 [History]


Dorzolamide [Trusopt 2% Ophth Soln] 1 drop EYELF BID 08/29/17 [History]


Latanoprost [Xalatan 0.005% Ophth Soln] 1 drop EYELF BEDTIME 08/29/17 [History]


Metoprolol Tartrate [Lopressor] 50 mg PO BID 08/29/17 [History]


OLANZapine [Olanzapine] 15 mg PO BEDTIME 08/29/17 [History]


guaiFENesin/Dextromethorphan [Safetussin DM] 10 ml PO Q4H PRN 08/29/17 [History]


metFORMIN [Glucophage] 500 mg PO BID 08/29/17 [History]


Betamethasone/Clotrimazole [Lotrisone] 1 appful TOP BID PRN 09/08/17 [History]


Memantine HCl [Namenda XR] 14 mg PO DAILY 09/08/17 [History]


Acetaminophen [Tylenol] 650 mg PO Q6H PRN 11/11/17 [History]


Na Phos,M-B/Na Phos,Di-Ba [Fleet Enema] 30 ml RECTAL Q3D PRN 11/11/17 [History]


Polyethylene Glycol 3350 [Miralax] 17 gr PO DAILY 11/11/17 [History]











Past Medical History


HEENT History: Reports: Glaucoma, Impaired Vision, Other (See Below)


Other HEENT History: dry eyes syndrome, wears eyeglasses.


Cardiovascular History: Reports: High Cholesterol, Hypertension


Gastrointestinal History: Reports: Chronic Constipation, GERD


Genitourinary History: Reports: Renal Disease, Other (See Below)


Other Genitourinary History: hx hyperglycemic--hyperosmolar coma ([facility])


OB/GYN History: Reports: Pregnancy


Musculoskeletal History: Reports: Gout


Other Musculoskeletal History: uses walker


Neurological History: Reports: Alzheimers Disease, Other (See Below)


Other Neuro History: encephalopathy, AMS


Psychiatric History: Reports: Alzheimers Disease, Schizophrenia, Other (See 

Below)


Other Psychiatric History: delirium


Endocrine/Metabolic History: Reports: Diabetes, Type II, Hypothyroidism, Obesity

/BMI 30+





- Past Surgical History


Other Endocrine Surgeries/Procedures: thyroid surgery





Social & Family History





- Family History


Family Medical History: Noncontributory





- Tobacco Use


Smoking Status *Q: Never Smoker


Second Hand Smoke Exposure: No





- Caffeine Use


Caffeine Use: Reports: None





- Recreational Drug Use


Recreational Drug Use: No





- Living Situation & Occupation


Living situation: Reports: Extended Care Facility (Currently living in the 

[facility])


Occupation: Retired





ED ROS GENERAL





- Review of Systems


Review Of Systems: See Below


Constitutional: Denies: Fever, Chills, Diaphoresis


HEENT: Denies: Throat Pain


Respiratory: Denies: Shortness of Breath, Pleuritic Chest Pain


Cardiovascular: Denies: Chest Pain


GI/Abdominal: Denies: Abdominal Pain, Nausea, Vomiting


Musculoskeletal: Denies: Back Pain, Leg Pain


Skin: Reports: No Symptoms


Neurological: Reports: Other (Patient was resting, mildly drowsy when I did 

walk into the room, she was arousable and does answer simple questions and 

follows simple commands).  Denies: Trouble Speaking, Weakness





ED EXAM, NEURO





- Physical Exam


Exam: See Below


General Appearance: Alert, No Apparent Distress


Eye Exam: Bilateral Eye: PERRL


Ears: Normal External Exam


Nose: Normal Inspection


Throat/Mouth: Normal Inspection, Other (Oral mucosa noted to be very dry)


Neck: Normal Inspection, Supple


Respiratory/Chest: No Respiratory Distress, Lungs Clear, Normal Breath Sounds


Cardiovascular: Regular Rate, Rhythm


GI/Abdominal: Soft, Non-Tender.  No: Guarding


Neurological: Alert, No Motor/Sensory Deficits, Other (Pleasantly confused, she 

does answer simple questions, follows simple commands, equal hand grasp 

bilaterally, feet withdraw equally to plantar stimulation)


Extremities: Normal Inspection, Pedal Edema.  No: Leg Pain (Very mild bilateral)

, Increased Warmth, Redness


Skin Exam: Warm, Dry, Normal Color





Course





- Vital Signs


Last Recorded V/S: 


 Last Vital Signs











Temp  97.7 F   11/11/17 12:15


 


Pulse  71   11/11/17 12:15


 


Resp  20   11/11/17 12:15


 


BP  123/76   11/11/17 12:15


 


Pulse Ox  94 L  11/11/17 12:15














- Orders/Labs/Meds


Orders: 


 Active Orders 24 hr











 Category Date Time Status


 


 EKG 12 Lead [EKG Documentation Completion] [RC] STAT Care  11/11/17 12:39 

Active


 


 Insert Pike Catheter [Insert Urinary Catheter] [OM.PC] Care  11/11/17 13:15 

Ordered





 Q24H   


 


 Peripheral IV Care [RC] .AS DIRECTED Care  11/11/17 12:40 Active


 


 Urinary Catheter Assessment [RC] ASDIRECTED Care  11/11/17 13:15 Active


 


 Chest 1V Frontal [CR] Stat Exams  11/11/17 12:39 Taken


 


 Sodium Chloride 0.9% [Saline Flush] Med  11/11/17 12:39 Active





 10 ml FLUSH ASDIRECTED PRN   


 


 Peripheral IV Insertion Adult [OM.PC] Stat Oth  11/11/17 12:39 Ordered








 Medication Orders





Sodium Chloride (Saline Flush)  10 ml FLUSH ASDIRECTED PRN


   PRN Reason: Keep Vein Open


   Last Admin: 11/11/17 12:45  Dose: 10 ml








Labs: 


 Laboratory Tests











  11/11/17 11/11/17 11/11/17 Range/Units





  12:45 12:45 13:20 


 


WBC  8.35    (3.98-10.04)  K/mm3


 


RBC  3.84 L    (3.98-5.22)  M/mm3


 


Hgb  12.2    (11.2-15.7)  gm/L


 


Hct  35.7    (34.1-44.9)  %


 


MCV  93.0    (79.4-94.8)  fl


 


MCH  31.8    (25.6-32.2)  pg


 


MCHC  34.2    (32.2-35.5)  g/dl


 


RDW Std Deviation  45.9    (36.4-46.3)  fL


 


Plt Count  178 L    (182-369)  K/mm3


 


MPV  11.3    (9.4-12.3)  fl


 


Neut % (Auto)  60.1    (34.0-71.1)  %


 


Lymph % (Auto)  32.0    (19.3-51.7)  %


 


Mono % (Auto)  6.2    (4.7-12.5)  %


 


Eos % (Auto)  1.4    (0.7-5.8)  


 


Baso % (Auto)  0.2    (0.1-1.2)  %


 


Neut # (Auto)  5.01    (1.56-6.13)  K/mm3


 


Lymph # (Auto)  2.67    (1.18-3.74)  K/mm3


 


Mono # (Auto)  0.52 H    (0.24-0.36)  K/mm3


 


Eos # (Auto)  0.12    (0.04-0.36)  K/mm3


 


Baso # (Auto)  0.02    (0.01-0.08)  K/mm3


 


Sodium   143   (136-145)  mEq/L


 


Potassium   4.2   (3.5-5.1)  mEq/L


 


Chloride   110 H   ()  mEq/L


 


Carbon Dioxide   23   (21-32)  mEq/L


 


Anion Gap   14.2   (5-15)  


 


BUN   21 H   (7-18)  mg/dL


 


Creatinine   1.3 H   (0.55-1.02)  mg/dL


 


Est Cr Clr Drug Dosing   28.55   mL/min


 


Estimated GFR (MDRD)   39   (>60)  mL/min


 


BUN/Creatinine Ratio   16.2   (14-18)  


 


Glucose   93   ()  mg/dL


 


Calcium   9.1   (8.5-10.1)  mg/dL


 


Total Bilirubin   0.3   (0.2-1.0)  mg/dL


 


AST   15   (15-37)  U/L


 


ALT   14   (14-59)  U/L


 


Alkaline Phosphatase   36 L   ()  U/L


 


NT-Pro-B Natriuret Pep   314   (0-450)  pg/mL


 


Total Protein   6.4   (6.4-8.2)  g/dl


 


Albumin   3.1 L   (3.4-5.0)  g/dl


 


Globulin   3.3   gm/dL


 


Albumin/Globulin Ratio   0.9 L   (1-2)  


 


Urine Color    Yellow  (Yellow)  


 


Urine Appearance    Clear  (Clear)  


 


Urine pH    5.5  (5.0-8.0)  


 


Ur Specific Gravity    > or = 1.030  (1.005-1.030)  


 


Urine Protein    Negative  (Negative)  


 


Urine Glucose (UA)    Negative  (Negative)  


 


Urine Ketones    Negative  (Negative)  


 


Urine Occult Blood    Negative  (Negative)  


 


Urine Nitrite    Negative  (Negative)  


 


Urine Bilirubin    Negative  (Negative)  


 


Urine Urobilinogen    0.2  (0.2-1.0)  


 


Ur Leukocyte Esterase    Negative  (Negative)  


 


Urine RBC    0-5  (0-5)  /hpf


 


Urine WBC    0-5  (0-5)  /hpf


 


Ur Epithelial Cells    0-5  (0-5)  /hpf


 


Urine Bacteria    Rare  (FEW)  /hpf


 


Urine Mucus    Not seen  (FEW)  /hpf











Meds: 


Medications











Generic Name Dose Route Start Last Admin





  Trade Name Freq  PRN Reason Stop Dose Admin


 


Sodium Chloride  10 ml  11/11/17 12:39  11/11/17 12:45





  Saline Flush  FLUSH   10 ml





  ASDIRECTED PRN   Administration





  Keep Vein Open   














Discontinued Medications














Generic Name Dose Route Start Last Admin





  Trade Name Flora  PRN Reason Stop Dose Admin


 


Sodium Chloride  250 mls @ 999 mls/hr  11/11/17 12:39  11/11/17 12:53





  Normal Saline  IV  11/11/17 12:54  999 mls/hr





  .BOLUS ONE   Administration














Departure





- Departure


Time of Disposition: 14:28


Disposition: Home, Self-Care 01


Condition: Fair


Clinical Impression: 


 Difficulty balancing





Alzheimer disease


Qualifiers:


 Alzheimer's disease onset: late-onset Dementia behavioral disturbance: with 

behavioral disturbance Qualified Code(s): G30.1 - Alzheimer's disease with late 

onset








- Discharge Information


Referrals: 


Aditya Yao MD [Primary Care Provider] - 


Forms:  ED Department Discharge


Additional Instructions: 


Continue present care, encourage fluids to maintain hydration, call medical 

provider or follow-up with medical provider as needed





- My Orders


Last 24 Hours: 


My Active Orders





11/11/17 12:39


EKG 12 Lead [EKG Documentation Completion] [RC] STAT 


Chest 1V Frontal [CR] Stat 


Sodium Chloride 0.9% [Saline Flush]   10 ml FLUSH ASDIRECTED PRN 


Peripheral IV Insertion Adult [OM.PC] Stat 





11/11/17 12:40


Peripheral IV Care [RC] .AS DIRECTED 





11/11/17 13:15


Insert Pike Catheter [Insert Urinary Catheter] [OM.PC] Q24H 


Urinary Catheter Assessment [RC] ASDIRECTED 














- Assessment/Plan


Last 24 Hours: 


My Active Orders





11/11/17 12:39


EKG 12 Lead [EKG Documentation Completion] [RC] STAT 


Chest 1V Frontal [CR] Stat 


Sodium Chloride 0.9% [Saline Flush]   10 ml FLUSH ASDIRECTED PRN 


Peripheral IV Insertion Adult [OM.PC] Stat 





11/11/17 12:40


Peripheral IV Care [RC] .AS DIRECTED 





11/11/17 13:15


Insert Pike Catheter [Insert Urinary Catheter] [OM.PC] Q24H 


Urinary Catheter Assessment [RC] ASDIRECTED

## 2017-11-11 NOTE — CT
Head CT

 

Technique: Multiple axial sections through the brain were obtained.  

Intravenous contrast was not utilized.

 

Comparison: Previous head CT study of 09/08/17.

 

Findings: Ventricles along with basal cisterns and sulci over the 

convexities are mildly prominent.  Slight diminished density is seen 

within the periventricular and subcortical white matter compatible 

with small vessel ischemic demyelination change.  These findings are 

similar to previous exam.  No other abnormal parenchymal densities are

 seen.  No evidence of intracranial hemorrhage.  No midline shift or 

mass effect is seen.

 

Atherosclerotic calcification noted within the vertebral vessels and 

within the carotid siphon.

 

Bone window settings were reviewed which shows no acute calvarial 

abnormality.  Visualized sinuses are clear.

 

Impression:

1.  Mild senescent change as noted above.  Nothing acute is 

appreciated on noncontrast head CT study.  No significant change is 

seen from previous study.

 

Diagnostic code #2

## 2017-11-13 NOTE — CR
Chest: Portable view of the chest was obtained.

 

Comparison: Prior chest x-ray of 07/30/17.

 

Heart size appears within normal limits for portable technique.  

Tortuous thoracic aorta is noted.  Small hiatal hernia is seen.  

Slight scarring and atelectasis is noted within both lung bases.  

Minimal scarring is noted within the right upper lung.  Lungs 

otherwise are clear.  Bony structures are grossly intact.

 

Impression:

1.  Mild bibasilar atelectasis and scarring.  Slight scarring within 

the right upper lung.

2.  Other incidental findings.  Nothing acute is otherwise seen.

 

Diagnostic code #2

## 2017-11-21 ENCOUNTER — HOSPITAL ENCOUNTER (INPATIENT)
Dept: HOSPITAL 41 - JD.ED | Age: 82
LOS: 3 days | Discharge: NURSING, REHAB OR LONG-TERM CARE | DRG: 69 | End: 2017-11-24
Attending: INTERNAL MEDICINE | Admitting: INTERNAL MEDICINE
Payer: MEDICARE

## 2017-11-21 DIAGNOSIS — K59.09: ICD-10-CM

## 2017-11-21 DIAGNOSIS — H40.9: ICD-10-CM

## 2017-11-21 DIAGNOSIS — Z66: ICD-10-CM

## 2017-11-21 DIAGNOSIS — Z88.8: ICD-10-CM

## 2017-11-21 DIAGNOSIS — E83.42: ICD-10-CM

## 2017-11-21 DIAGNOSIS — Z79.84: ICD-10-CM

## 2017-11-21 DIAGNOSIS — G30.9: ICD-10-CM

## 2017-11-21 DIAGNOSIS — I10: ICD-10-CM

## 2017-11-21 DIAGNOSIS — E78.5: ICD-10-CM

## 2017-11-21 DIAGNOSIS — H04.129: ICD-10-CM

## 2017-11-21 DIAGNOSIS — K21.9: ICD-10-CM

## 2017-11-21 DIAGNOSIS — R91.1: ICD-10-CM

## 2017-11-21 DIAGNOSIS — F20.9: ICD-10-CM

## 2017-11-21 DIAGNOSIS — Z87.891: ICD-10-CM

## 2017-11-21 DIAGNOSIS — Z88.6: ICD-10-CM

## 2017-11-21 DIAGNOSIS — E11.9: ICD-10-CM

## 2017-11-21 DIAGNOSIS — Z88.0: ICD-10-CM

## 2017-11-21 DIAGNOSIS — Z79.899: ICD-10-CM

## 2017-11-21 DIAGNOSIS — M10.9: ICD-10-CM

## 2017-11-21 DIAGNOSIS — R41.0: ICD-10-CM

## 2017-11-21 DIAGNOSIS — G45.9: Primary | ICD-10-CM

## 2017-11-21 DIAGNOSIS — E03.9: ICD-10-CM

## 2017-11-21 DIAGNOSIS — R62.7: ICD-10-CM

## 2017-11-21 DIAGNOSIS — F02.80: ICD-10-CM

## 2017-11-21 DIAGNOSIS — Z91.81: ICD-10-CM

## 2017-11-21 DIAGNOSIS — E66.9: ICD-10-CM

## 2017-11-21 PROCEDURE — 71010: CPT

## 2017-11-21 PROCEDURE — 93005 ELECTROCARDIOGRAM TRACING: CPT

## 2017-11-21 PROCEDURE — 85730 THROMBOPLASTIN TIME PARTIAL: CPT

## 2017-11-21 PROCEDURE — 87804 INFLUENZA ASSAY W/OPTIC: CPT

## 2017-11-21 PROCEDURE — 85610 PROTHROMBIN TIME: CPT

## 2017-11-21 PROCEDURE — 83880 ASSAY OF NATRIURETIC PEPTIDE: CPT

## 2017-11-21 PROCEDURE — 96360 HYDRATION IV INFUSION INIT: CPT

## 2017-11-21 PROCEDURE — 85025 COMPLETE CBC W/AUTO DIFF WBC: CPT

## 2017-11-21 PROCEDURE — 81001 URINALYSIS AUTO W/SCOPE: CPT

## 2017-11-21 PROCEDURE — 99285 EMERGENCY DEPT VISIT HI MDM: CPT

## 2017-11-21 PROCEDURE — 84484 ASSAY OF TROPONIN QUANT: CPT

## 2017-11-21 PROCEDURE — 36415 COLL VENOUS BLD VENIPUNCTURE: CPT

## 2017-11-21 PROCEDURE — 80053 COMPREHEN METABOLIC PANEL: CPT

## 2017-11-21 PROCEDURE — 70450 CT HEAD/BRAIN W/O DYE: CPT

## 2017-11-21 PROCEDURE — 83605 ASSAY OF LACTIC ACID: CPT

## 2017-11-21 PROCEDURE — 71250 CT THORAX DX C-: CPT

## 2017-11-21 PROCEDURE — 87040 BLOOD CULTURE FOR BACTERIA: CPT

## 2017-11-21 PROCEDURE — 83735 ASSAY OF MAGNESIUM: CPT

## 2017-11-21 NOTE — CT
CT chest

 

Technique: Multiple axial sections through the chest were obtained.  

Intravenous contrast was not utilized.

 

Comparison: Prior chest x-ray of performed on the same day (2:43 PM).

 

Findings: Moderately large hiatal hernia is seen.  Minimal coronary 

artery calcification is noted.  Slight atherosclerotic calcification 

is seen within the thoracic aorta and branch vessels.  No mediastinal 

or hilar adenopathy is seen.  Slight increased lung markings within 

the lingula and left base are seen compatible with mild fibrosis.  

Small nodule is noted within the right middle lobe measuring about 3 

mm.  Lungs otherwise are clear.

 

Bone window settings were reviewed which show scattered disc space 

narrowing and vacuum phenomena within the mid and lower thoracic spine

 with scattered endplate osteophytes.

 

Impression:

1.  3 mm nodule within the right middle lobe.  If patient is a smoker,

 recommend repeat study in 1 year.  If patient is not a smoker this 

can be ignored.

2.  Slight left basilar fibrosis.

3.  Other incidental findings.  Nothing acute is identified.

 

Diagnostic code #3

## 2017-11-21 NOTE — EDM.PDOC
ED HPI GENERAL MEDICAL PROBLEM





- General


Chief Complaint: Neurological Problem


Stated Complaint: [facility]


Time Seen by Provider: 11/21/17 14:41





- History of Present Illness


INITIAL COMMENTS - FREE TEXT/NARRATIVE: 





82-year-old female brought in by EMS with increased weakness thought to be 

worse on the right side.





The patient's had worsening weakness over the last several weeks that seems to 

be progressively getting worse she had pretty extensive evaluation here on the 

11th this month results of that workup were unrevealing including lab work x-

ray evaluation and head CT. Patient is denying pain the patient did roll out of 

bed this morning hitting the floor it is unknown if she hit her head. They did 

not appreciate any new weakness with her at her 9:00 checks however at noon 

today she was not doing so well and then after this was thought maybe she had 

some right-sided weakness. She does not have right-sided weakness compared to 

the left here in the emergency department. The patient has advanced dementia 

and it is somewhat hard to evaluate. Her last known to be normal is nearly 6 

hours.





The patient shortly after arrival is accompanied by her daughter who has 

medical power of . The patient should be a DNR they do not want the 

patient transferred, and we just do the best with what we have to work with 

here.





- Related Data


 Allergies











Allergy/AdvReac Type Severity Reaction Status Date / Time


 


atorvastatin Allergy  Other Verified 09/08/17 15:56


 


codeine Allergy  Other Verified 09/08/17 15:56


 


losartan Allergy  Other Verified 09/08/17 15:56


 


morphine Allergy  Other Verified 09/08/17 15:56


 


Penicillins Allergy  Other Verified 09/08/17 15:56











Home Meds: 


 Home Meds





Acetaminophen [Tylenol] 650 mg PO Q6H PRN 07/30/17 [History]


Calcium Carbonate [Tums] 200 mg PO ASDIRECTED PRN 07/30/17 [History]


Levothyroxine 150 mcg PO ACBREAKFAST 07/30/17 [History]


Sennosides [Senna] 8.6 mg PO DAILY PRN 07/30/17 [History]


Timolol Maleate [Timoptic 0.25% Ophth Soln] 1 drop EYELF BID 07/30/17 [History]


Bisacodyl [Dulcolax] 10 mg RECTAL ASDIRECTED PRN 08/29/17 [History]


Brimonidine Tartrate [Alphagan P 0.1% Ophth Soln] 1 drop EYEBOTH BID 08/29/17 [

History]


[place]/Min Oil/Desire/Wool Alcoh [Eucerin Creme] 1 applic EARBOTH BID PRN 08/29/ 17 [History]


Colesevelam [Welchol] 3 tab PO BID 08/29/17 [History]


Dextran 70/Hypromellose [Artificial Tears] 1 drop EYEBOTH QID 08/29/17 [History]


Dorzolamide [Trusopt 2% Ophth Soln] 1 drop EYELF BID 08/29/17 [History]


Latanoprost [Xalatan 0.005% Ophth Soln] 1 drop EYELF BEDTIME 08/29/17 [History]


Metoprolol Tartrate [Lopressor] 50 mg PO BID 08/29/17 [History]


OLANZapine [Olanzapine] 15 mg PO BEDTIME 08/29/17 [History]


guaiFENesin/Dextromethorphan [Safetussin DM] 10 ml PO Q4H PRN 08/29/17 [History]


metFORMIN [Glucophage] 500 mg PO BID 08/29/17 [History]


Betamethasone/Clotrimazole [Lotrisone] 1 appful TOP BID PRN 09/08/17 [History]


Memantine HCl [Namenda XR] 14 mg PO DAILY 09/08/17 [History]


Acetaminophen [Tylenol] 650 mg PO Q6H PRN 11/11/17 [History]


Na Phos,M-B/Na Phos,Di-Ba [Fleet Enema] 30 ml RECTAL Q3D PRN 11/11/17 [History]


Polyethylene Glycol 3350 [Miralax] 17 gr PO DAILY 11/11/17 [History]











Past Medical History


HEENT History: Reports: Glaucoma, Impaired Vision, Other (See Below)


Other HEENT History: dry eyes syndrome, wears eyeglasses.


Cardiovascular History: Reports: High Cholesterol, Hypertension


Gastrointestinal History: Reports: Chronic Constipation, GERD


Genitourinary History: Reports: Renal Disease, Other (See Below)


Other Genitourinary History: hx hyperglycemic--hyperosmolar coma ([facility])


OB/GYN History: Reports: Pregnancy


Musculoskeletal History: Reports: Gout


Other Musculoskeletal History: uses walker


Neurological History: Reports: Alzheimers Disease, Other (See Below)


Other Neuro History: encephalopathy, AMS


Psychiatric History: Reports: Alzheimers Disease, Dementia, Schizophrenia, 

Other (See Below)


Other Psychiatric History: delirium


Endocrine/Metabolic History: Reports: Diabetes, Type II, Hypothyroidism, Obesity

/BMI 30+





- Past Surgical History


Other Endocrine Surgeries/Procedures: thyroid surgery





Social & Family History





- Family History


Family Medical History: Noncontributory





- Tobacco Use


Smoking Status *Q: Former Smoker


Used Tobacco, but Quit: Yes


Month Tobacco Last Used: 40 yrs


Second Hand Smoke Exposure: No





- Caffeine Use


Caffeine Use: Reports: Coffee





- Recreational Drug Use


Recreational Drug Use: No





- Living Situation & Occupation


Living situation: Reports: Extended Care Facility (Currently living in the 

[facility])


Occupation: Retired





ED ROS GENERAL





- Review of Systems


Review Of Systems: Unable To Obtain (Advanced dementia makes this difficult)





ED EXAM, NEURO





- Physical Exam


Exam: See Below


Exam Limited By: Other (She will follow simple directions)


General Appearance: No Apparent Distress, Lethargic (She is slightly lethargic)


Eye Exam: Left Eye: Conjunctival Injection (This is normal for her), Bilateral 

Eye: Normal Inspection, PERRL


Ears: Normal External Exam, Normal Canal, Normal TMs


Nose: Normal Inspection, Normal Mucosa


Throat/Mouth: Normal Inspection, Other (Dry mucous membranes)


Head Exam: Atraumatic, Normocephalic


Neck: Normal Inspection, Supple, Non-Tender, Full Range of Motion.  No: 

Lymphadenopathy (L), Lymphadenopathy (R)


Respiratory/Chest: No Respiratory Distress, Lungs Clear, Normal Breath Sounds


Cardiovascular: Regular Rate, Rhythm, No Edema, No Murmur


GI/Abdominal: Normal Bowel Sounds, Soft, Non-Tender.  No: Distended, Guarding, 

Rigid, Rebound


Neurological: Other (No localized weakness the patient lift her legs and her 

arms as well as squeeze fingers. No right to left discrepancy)


Back Exam: Normal Inspection.  No: CVA Tenderness (L), Paraspinal Tenderness


Extremities: Normal Inspection, Non-Tender


Skin Exam: Warm, Dry, Intact





Course





- Vital Signs


Last Recorded V/S: 


 Last Vital Signs











Temp  36.3 C   11/21/17 14:27


 


Pulse  89   11/21/17 14:27


 


Resp  20   11/21/17 14:27


 


BP  152/92 H  11/21/17 14:27


 


Pulse Ox  96   11/21/17 14:27














- Orders/Labs/Meds


Orders: 


 Active Orders 24 hr











 Category Date Time Status


 


 CULTURE BLOOD [BC] Stat Lab  11/21/17 15:25 Received


 


 CULTURE BLOOD [BC] Stat Lab  11/21/17 15:35 Received


 


 Blood Culture x2 Reflex Set [OM.PC] Stat Oth  11/21/17 14:42 Ordered











Labs: 


 Laboratory Tests











  11/21/17 11/21/17 11/21/17 Range/Units





  15:24 15:24 15:24 


 


WBC  9.92    (3.98-10.04)  K/mm3


 


RBC  4.04    (3.98-5.22)  M/mm3


 


Hgb  12.6    (11.2-15.7)  gm/L


 


Hct  37.2    (34.1-44.9)  %


 


MCV  92.1    (79.4-94.8)  fl


 


MCH  31.2    (25.6-32.2)  pg


 


MCHC  33.9    (32.2-35.5)  g/dl


 


RDW Std Deviation  45.1    (36.4-46.3)  fL


 


Plt Count  193    (182-369)  K/mm3


 


MPV  11.8    (9.4-12.3)  fl


 


Neutrophils % (Manual)  82 H    (40-60)  %


 


Band Neutrophils %  0    (0-10)  %


 


Lymphocytes % (Manual)  15 L    (20-40)  %


 


Atypical Lymphs %  0    %


 


Monocytes % (Manual)  2    (2-10)  %


 


Eosinophils % (Manual)  1    (0.7-5.8)  %


 


Basophils % (Manual)  0 L    (0.1-1.2)  


 


Platelet Estimate  Adequate    


 


Plt Morphology Comment  Normal    


 


RBC Morph Comment  Normal    


 


PT   12.0   (8.0-13.0)  SECONDS


 


INR   1.09   


 


APTT   29   (22-36)  SECONDS


 


Sodium    144  (136-145)  mEq/L


 


Potassium    3.6  (3.5-5.1)  mEq/L


 


Chloride    109 H  ()  mEq/L


 


Carbon Dioxide    23  (21-32)  mEq/L


 


Anion Gap    15.6 H  (5-15)  


 


BUN    27 H  (7-18)  mg/dL


 


Creatinine    1.2 H  (0.55-1.02)  mg/dL


 


Est Cr Clr Drug Dosing    32.29  mL/min


 


Estimated GFR (MDRD)    43  (>60)  mL/min


 


BUN/Creatinine Ratio    22.5 H  (14-18)  


 


Glucose    98  ()  mg/dL


 


Lactic Acid     (0.4-2.0)  mmol/L


 


Calcium    10.0  (8.5-10.1)  mg/dL


 


Total Bilirubin    0.7  (0.2-1.0)  mg/dL


 


AST    21  (15-37)  U/L


 


ALT    15  (14-59)  U/L


 


Alkaline Phosphatase    35 L  ()  U/L


 


Troponin I    < 0.017  (0.00-0.056)  ng/mL


 


NT-Pro-B Natriuret Pep    211  (0-450)  pg/mL


 


Total Protein    6.9  (6.4-8.2)  g/dl


 


Albumin    3.5  (3.4-5.0)  g/dl


 


Globulin    3.4  gm/dL


 


Albumin/Globulin Ratio    1.0  (1-2)  


 


Urine Color     (Yellow)  


 


Urine Appearance     (Clear)  


 


Urine pH     (5.0-8.0)  


 


Ur Specific Gravity     (1.005-1.030)  


 


Urine Protein     (Negative)  


 


Urine Glucose (UA)     (Negative)  


 


Urine Ketones     (Negative)  


 


Urine Occult Blood     (Negative)  


 


Urine Nitrite     (Negative)  


 


Urine Bilirubin     (Negative)  


 


Urine Urobilinogen     (0.2-1.0)  


 


Ur Leukocyte Esterase     (Negative)  


 


Urine RBC     (0-5)  /hpf


 


Urine WBC     (0-5)  /hpf


 


Ur Epithelial Cells     (0-5)  /hpf


 


Urine Bacteria     (FEW)  /hpf


 


Hyaline Casts     (0-5)  /lpf


 


Urine Mucus     (FEW)  /hpf














  11/21/17 11/21/17 Range/Units





  15:24 16:00 


 


WBC    (3.98-10.04)  K/mm3


 


RBC    (3.98-5.22)  M/mm3


 


Hgb    (11.2-15.7)  gm/L


 


Hct    (34.1-44.9)  %


 


MCV    (79.4-94.8)  fl


 


MCH    (25.6-32.2)  pg


 


MCHC    (32.2-35.5)  g/dl


 


RDW Std Deviation    (36.4-46.3)  fL


 


Plt Count    (182-369)  K/mm3


 


MPV    (9.4-12.3)  fl


 


Neutrophils % (Manual)    (40-60)  %


 


Band Neutrophils %    (0-10)  %


 


Lymphocytes % (Manual)    (20-40)  %


 


Atypical Lymphs %    %


 


Monocytes % (Manual)    (2-10)  %


 


Eosinophils % (Manual)    (0.7-5.8)  %


 


Basophils % (Manual)    (0.1-1.2)  


 


Platelet Estimate    


 


Plt Morphology Comment    


 


RBC Morph Comment    


 


PT    (8.0-13.0)  SECONDS


 


INR    


 


APTT    (22-36)  SECONDS


 


Sodium    (136-145)  mEq/L


 


Potassium    (3.5-5.1)  mEq/L


 


Chloride    ()  mEq/L


 


Carbon Dioxide    (21-32)  mEq/L


 


Anion Gap    (5-15)  


 


BUN    (7-18)  mg/dL


 


Creatinine    (0.55-1.02)  mg/dL


 


Est Cr Clr Drug Dosing    mL/min


 


Estimated GFR (MDRD)    (>60)  mL/min


 


BUN/Creatinine Ratio    (14-18)  


 


Glucose    ()  mg/dL


 


Lactic Acid  1.1   (0.4-2.0)  mmol/L


 


Calcium    (8.5-10.1)  mg/dL


 


Total Bilirubin    (0.2-1.0)  mg/dL


 


AST    (15-37)  U/L


 


ALT    (14-59)  U/L


 


Alkaline Phosphatase    ()  U/L


 


Troponin I    (0.00-0.056)  ng/mL


 


NT-Pro-B Natriuret Pep    (0-450)  pg/mL


 


Total Protein    (6.4-8.2)  g/dl


 


Albumin    (3.4-5.0)  g/dl


 


Globulin    gm/dL


 


Albumin/Globulin Ratio    (1-2)  


 


Urine Color   Yellow  (Yellow)  


 


Urine Appearance   Clear  (Clear)  


 


Urine pH   5.5  (5.0-8.0)  


 


Ur Specific Gravity   1.025  (1.005-1.030)  


 


Urine Protein   Trace H  (Negative)  


 


Urine Glucose (UA)   Negative  (Negative)  


 


Urine Ketones   1+ H  (Negative)  


 


Urine Occult Blood   Negative  (Negative)  


 


Urine Nitrite   Negative  (Negative)  


 


Urine Bilirubin   Negative  (Negative)  


 


Urine Urobilinogen   0.2  (0.2-1.0)  


 


Ur Leukocyte Esterase   Negative  (Negative)  


 


Urine RBC   0-5  (0-5)  /hpf


 


Urine WBC   0-5  (0-5)  /hpf


 


Ur Epithelial Cells   Not seen  (0-5)  /hpf


 


Urine Bacteria   Few  (FEW)  /hpf


 


Hyaline Casts   5-10 H  (0-5)  /lpf


 


Urine Mucus   Few  (FEW)  /hpf











Meds: 


Medications














Discontinued Medications














Generic Name Dose Route Start Last Admin





  Trade Name Flora  PRN Reason Stop Dose Admin


 


Lactated Ringer's  500 mls @ 999 mls/hr  11/21/17 15:00  11/21/17 15:24





  Ringers, Lactated  IV  11/21/17 15:30  999 mls/hr





  .BOLUS ONE   Administration














- Re-Assessments/Exams


Free Text/Narrative Re-Assessment/Exam: 





11/21/17 17:29


The evaluation nondiagnostic at this point chest x-ray showed questionable left-

sided density follow-up CT was nondiagnostic she's got some left basilar 

fibrosis no pneumonia or a small nodule 3 mm in the right middle lobe no 

history of smoking this could be ignored head CT shows no acute changes she is 

possible she's had a TIA but no specific findings at this point overall 

medically she just doing worse. Case discussed with Dr. Carcamo patient will come 

in for further evaluation.





Departure





- Departure


Time of Disposition: 17:31


Disposition: Admitted As Inpatient 66


Clinical Impression: 


 TIA (transient ischemic attack), Failure to thrive








- Discharge Information





- My Orders


Last 24 Hours: 


My Active Orders





11/21/17 14:42


Blood Culture x2 Reflex Set [OM.PC] Stat 





11/21/17 15:25


CULTURE BLOOD [BC] Stat 





11/21/17 15:35


CULTURE BLOOD [BC] Stat 














- Assessment/Plan


Last 24 Hours: 


My Active Orders





11/21/17 14:42


Blood Culture x2 Reflex Set [OM.PC] Stat 





11/21/17 15:25


CULTURE BLOOD [BC] Stat 





11/21/17 15:35


CULTURE BLOOD [BC] Stat

## 2017-11-21 NOTE — CR
Chest: Portable view of the chest was obtained.

 

Comparison: Prior chest x-ray of 07/30/17.

 

Heart size and mediastinum are within normal limits for portable 

technique.  Slight increased density within the left mid to lower lung

 is noted.  Some of this appears to be chronic but difficult to 

exclude minimal superimposed pneumonia or atelectasis.  Lungs 

otherwise are clear.  Bony structures are grossly intact.

 

Impression:

1.  Slight increased density within the left mid to lower lung as 

noted above.

 

Diagnostic code #3

## 2017-11-21 NOTE — PCM.HP
H&P History of Present Illness





- General


Date of Service: 11/21/17


Admit Problem/Dx: 


 Admission Diagnosis/Problem





Admission Diagnosis/Problem      TIA, Transient ischemic attack








Source of Information: Patient, Family, Old Records, Provider, RN, RN Notes 

Reviewed


History Limitations: Reports: Altered Mental Status (advanced dimentia but 

follows simple commands )





- History of Present Illness


Initial Comments - Free Text/Narative: 


Yara Gates is a 81 yo female who presents to our ED today via ambulance 

from [place], where she lives in the nursing home there.  She has had 

worsening weakness over the past several weeks.  She was seen on the 11th of 

this month after falls and weakness and had a very extensive workup including 

lab work and head CT, which was essentially unremarkable.  His reported the 

patient rolled out of bed this morning and hit the floor, however it is unknown 

if she had her head.  On arrival she is denying any pain.  It is reported at 

her 9 AM checks the patient was doing well however around noon today they 

thought she had some right-sided weakness.  On arrival to the ED no weakness is 

noted.  She does have advanced dementia.  The patient's daughter arrives in the 

ED and reports she would like the patient to be DNR/DNI.  The daughter is the 

medical power of .  The daughter refuses transfer and would like us to 

do what we can locally.





In the ED she is afebrile with a temp of 36.3 Celsius.  Pulse is 89, 

respirations 20, BP is elevated at 150/92, and pulse ox is 96% on room air.  

Blood cultures are ordered.  W CBC is normal at 9.9 to.  Hemoglobin 12.6.  

Hematocrit 37.2.  She was normocytic.  Platelets are normal at 193,000.  

Neutrophils are elevated at 82%.  There is no bandemia.  PT is normal at 12.  

INR 1.09.  APTT 29.  Sodium normal at 144.  Potassium 3.6.  Chloride slightly 

elevated at 109.  Carbon dioxide 23.  Anion gap is slightly elevated at 15.6.  

BUN is elevated at 27.  Creatinine elevated at 1.2.  EGFR is 43.  Glucose 98.  

Lactic acid 1.1.  Calcium normal at 10.  Total bilirubin 0.7.  AST 21, ALT 15, 

alkaline phosphatase 35.  Troponin negative at less than 0.017.  ProBNP 211.  

Albumin 3.5.  UA is negative however trace urine protein and 1+ ketones are 

noted.  Hyaline casts are also noted at 5-10.  Portable CXR is obtained showing 

what was thought to be a slightly increased density within the left mid to 

lower lung is reported difficult to exclude minimal superimposed pneumonia or 

atelectasis.  This is interpreted by Dr. Coppola.  Chest CT is obtained and 

interpreted by Dr. Coppola.  Impression: 1.  3 mm nodule within the right middle 

lobe.  If patient is a smoker, recommend repeat study in one year.  If patient 

does not smoke or this can be ordered.  2.  Slight left basilar fibrosis.  3.  

Other incidental findings.  Nothing acute is identified.  Head CT is also 

obtained and interpreted by Dr. Coppola as: 1.  Senescent changes described 

above.  No acute intracranial abnormalities seen.  No significant change from 

previous study. 





She carries a history of: Glaucoma, dry syndrome, HLD, HTN, chronic constipation

, GERD, Alzheimer's disease, dementia, schizophrenia, delirium, type II DM, 

hypothyroidism, obesity.





She says really admitted to the medical floor with telemetry.  She is a DNR\

DNI.  I discussed this with patient's daughter on the phone as she is listed as 

full code in nursing home records.  Daughter who is power of  requests 

DNR/DNI as patient is confused and has trouble with answering questions.  

Patient's PCP is Dr. Yao at [facility].








- Related Data


Allergies/Adverse Reactions: 


 Allergies











Allergy/AdvReac Type Severity Reaction Status Date / Time


 


atorvastatin Allergy  Other Verified 09/08/17 15:56


 


codeine Allergy  Other Verified 09/08/17 15:56


 


losartan Allergy  Other Verified 09/08/17 15:56


 


morphine Allergy  Other Verified 09/08/17 15:56


 


Penicillins Allergy  Other Verified 09/08/17 15:56











Home Medications: 


 Home Meds





Acetaminophen [Tylenol] 650 mg PO Q6H PRN 07/30/17 [History]


Calcium Carbonate [Tums] 200 mg PO ASDIRECTED PRN 07/30/17 [History]


Levothyroxine 150 mcg PO ACBREAKFAST 07/30/17 [History]


Sennosides [Senna] 8.6 mg PO DAILY PRN 07/30/17 [History]


Timolol Maleate [Timoptic 0.25% Ophth Soln] 1 drop EYELF BID 07/30/17 [History]


Bisacodyl [Dulcolax] 10 mg RECTAL ASDIRECTED PRN 08/29/17 [History]


Brimonidine Tartrate [Alphagan P 0.1% Ophth Soln] 1 drop EYEBOTH BID 08/29/17 [

History]


[place]/Min Oil/Desire/Wool Alcoh [Eucerin Creme] 1 applic EARBOTH BID PRN 08/29/ 17 [History]


Colesevelam [Welchol] 3 tab PO BID 08/29/17 [History]


Dextran 70/Hypromellose [Artificial Tears] 1 drop EYEBOTH QID 08/29/17 [History]


Dorzolamide [Trusopt 2% Ophth Soln] 1 drop EYELF BID 08/29/17 [History]


Latanoprost [Xalatan 0.005% Ophth Soln] 1 drop EYELF BEDTIME 08/29/17 [History]


Metoprolol Tartrate [Lopressor] 50 mg PO BID 08/29/17 [History]


OLANZapine [Olanzapine] 15 mg PO BEDTIME 08/29/17 [History]


guaiFENesin/Dextromethorphan [Safetussin DM] 10 ml PO Q4H PRN 08/29/17 [History]


metFORMIN [Glucophage] 500 mg PO BID 08/29/17 [History]


Betamethasone/Clotrimazole [Lotrisone] 1 appful TOP BID PRN 09/08/17 [History]


Memantine HCl [Namenda XR] 14 mg PO DAILY 09/08/17 [History]


Acetaminophen [Tylenol] 650 mg PO Q6H PRN 11/11/17 [History]


Na Phos,M-B/Na Phos,Di-Ba [Fleet Enema] 30 ml RECTAL Q3D PRN 11/11/17 [History]


Polyethylene Glycol 3350 [Miralax] 17 gr PO DAILY 11/11/17 [History]











Past Medical History


HEENT History: Reports: Glaucoma, Impaired Vision, Other (See Below)


Other HEENT History: dry eyes syndrome, wears eyeglasses.


Cardiovascular History: Reports: High Cholesterol, Hypertension


Gastrointestinal History: Reports: Chronic Constipation, GERD


Genitourinary History: Reports: Renal Disease, Other (See Below)


Other Genitourinary History: hx hyperglycemic--hyperosmolar coma ([facility])


OB/GYN History: Reports: Pregnancy


Musculoskeletal History: Reports: Gout


Other Musculoskeletal History: uses walker


Neurological History: Reports: Alzheimers Disease, Other (See Below)


Other Neuro History: encephalopathy, AMS


Psychiatric History: Reports: Alzheimers Disease, Dementia, Schizophrenia, 

Other (See Below)


Other Psychiatric History: delirium


Endocrine/Metabolic History: Reports: Diabetes, Type II, Hypothyroidism, Obesity

/BMI 30+





- Past Surgical History


Other Endocrine Surgeries/Procedures: thyroid surgery





Social & Family History





- Family History


Family Medical History: Noncontributory





- Tobacco Use


Smoking Status *Q: Former Smoker


Used Tobacco, but Quit: Yes


Month Tobacco Last Used: 40 yrs


Second Hand Smoke Exposure: No





- Caffeine Use


Caffeine Use: Reports: Coffee





- Recreational Drug Use


Recreational Drug Use: No





- Living Situation & Occupation


Living situation: Reports: Extended Care Facility (Currently living in the 

[facility])


Occupation: Retired





H&P Review of Systems





- Review of Systems:


Review Of Systems: Unable To Obtain


Free Text/Narrative: 


I attempted to obtain a review of systems from the patient.  The patient does 

have advanced dementia.  I asked the patient if she had any pain and she stated 

no.  I then asked if she was having any chest pain and she said yes.  I asked a 

few more questions after this which were answered as yes.  I returned to the 

question of if she had any chest pain and she said no. The patient went back 

and forth on multiple questions like this.  In regards to this confusion, the 

patient's daughter reported to nursing that this is essentially her baseline.  

It is noted that she has been recently seen in the ED 2 prior times for falls 

and weakness.  Today was her third head CT in recent months.  The patient is 

otherwise pleasant.  Her daughter does report the patient is very mobile and 

has a history of bad sundowning.  She reports the patient will frequently get 

out of bed. This was communicated to nursing as well and documented in the 

nursing home notes. 








Exam





- Exam


Exam: See Below





- Vital Signs


Vital Signs: 


 Last Vital Signs











Temp  97.7 F   11/21/17 19:10


 


Pulse  75   11/21/17 19:10


 


Resp  14   11/21/17 19:10


 


BP  122/73   11/21/17 19:10


 


Pulse Ox  96   11/21/17 19:10











Weight: 200 lb





- Exam


Quality Assessment: DVT Prophylaxis


General: Alert, Cooperative, Other (sleepy).  No: Mild Distress


HEENT: EACs Clear, EOMI, Hearing Intact, Mucosa Moist & Pink, Nares Patent, 

Normal Nasal Septum, Posterior Pharynx Clear, Other (left eye conjunctival 

injection which is baseline), PERRLA


Neck: Supple, Trachea Midline, Full Range of Motion.  No: JVD, Thyromegaly


Lungs: Clear to Auscultation, Normal Respiratory Effort


Cardiovascular: Regular Rate, Regular Rhythm


GI/Abdominal Exam: Normal Bowel Sounds, Soft, Non-Tender, No Organomegaly, No 

Distention, No Abnormal Bruit, No Mass, Pelvis Stable


 (Female) Exam: Deferred


Rectal (Female) Exam: Deferred


Back Exam: Normal Inspection, Full Range of Motion.  No: CVA Tenderness (L), 

CVA Tenderness (R)


Extremities: Normal Inspection, Normal Range of Motion, Non-Tender, No Pedal 

Edema, Normal Capillary Refill


Peripheral Pulses: 1+: Radial (L), Radial (R), Dorsalis Pedis (L), Dorsalis 

Pedis (R)


Skin: Warm, Dry, Intact


Neurological: Cranial Nerves Intact (Grossly), Strength Equal Bilateral, Normal 

Tone, Sensation Intact


Neuro Extensive - Mental Status: Alert, Disorientation to Place, Disorientation 

to Time, Other (Confusion family reports as baseline.)


Neuro Extensive - Motor, Sensory, Reflexes: CN II-XII Intact (Grossly).  No: 

Tongue Deviation (L), Tongue Deviation (R), Receptive Aphasia, Expressive 

Aphasia, Total Aphasia, Facial palsy (L), Facial Palsy (R), Hemeplagia (L)


Psychiatric: Alert.  No: Normal Mood





- Patient Data


Result Diagrams: 


 11/21/17 15:24





 11/21/17 15:24





EKG INTERPRETATION


EKG Date: 11/21/17


Time: 20:46


Rhythm: NSR


Axis: Normal


P-Wave: Present


QRS: Normal


ST-T: Normal


QT: Normal


EKG Interpretation Comments: 


T wave inversion on III, V1. Q waves noted in III, aVF. 








*Q Meaningful Use (ADM)





- VTE *Q


VTE Criteria *Q: 








- Stroke *Q


Stroke Criteria *Q: 








- AMI *Q


AMI Criteria *Q: 








- Problem List


(1) TIA (transient ischemic attack)


SNOMED Code(s): 222214174


   ICD Code: G45.9 - TRANSIENT CEREBRAL ISCHEMIC ATTACK, UNSPECIFIED   Status: 

Acute   Priority: High   Current Visit: Yes   


Qualifiers: 


   Transient cerebral ischemia type: unspecified   Qualified Code(s): G45.9 - 

Transient cerebral ischemic attack, unspecified   





(2) Hypomagnesemia


SNOMED Code(s): 042684300


   ICD Code: E83.42 - HYPOMAGNESEMIA   Status: Acute   Priority: High   Current 

Visit: Yes   





(3) Failure to thrive


SNOMED Code(s): 15132452


   ICD Code: JKP8088 -    Status: Acute   Priority: High   Current Visit: Yes   


Qualifiers: 


   Failure to thrive age range: in adult   Qualified Code(s): R62.7 - Adult 

failure to thrive   





(4) Alzheimer disease


SNOMED Code(s): 27616641


   ICD Code: G30.9 - ALZHEIMER'S DISEASE, UNSPECIFIED   Status: Chronic   

Priority: High   Current Visit: Yes   


Qualifiers: 


   Alzheimer's disease onset: late-onset   Dementia behavioral disturbance: 

with behavioral disturbance   Qualified Code(s): G30.1 - Alzheimer's disease 

with late onset; F02.81 - Dementia in other diseases classified elsewhere with 

behavioral disturbance   





(5) Confusion


SNOMED Code(s): 296953526


   ICD Code: R41.0 - DISORIENTATION, UNSPECIFIED   Status: Chronic   Priority: 

High   Current Visit: Yes   





(6) Hypothyroidism


SNOMED Code(s): 84671458


   ICD Code: E03.9 - HYPOTHYROIDISM, UNSPECIFIED   Status: Chronic   Priority: 

Low   Current Visit: No   


Qualifiers: 


   Hypothyroidism type: unspecified   Qualified Code(s): E03.9 - Hypothyroidism

, unspecified   





(7) Lung nodule < 6cm on CT


SNOMED Code(s): 365192444


   ICD Code: R91.1 - SOLITARY PULMONARY NODULE   Status: Chronic   Priority: 

Low   Current Visit: Yes   





(8) Type II diabetes mellitus


SNOMED Code(s): 27828810


   ICD Code: E11.9 - TYPE 2 DIABETES MELLITUS WITHOUT COMPLICATIONS   Status: 

Chronic   Priority: Low   Current Visit: Yes   


Qualifiers: 


   Diabetes mellitus complication status: with unspecified complications   

Diabetes mellitus long term insulin use: without long term use   Qualified Code(

s): E11.8 - Type 2 diabetes mellitus with unspecified complications   


Problem List Initiated/Reviewed/Updated: Yes


Orders Last 24hrs: 


 Active Orders 24 hr











 Category Date Time Status


 


 Patient Status [ADT] Stat ADT  11/21/17 18:54 Active


 


 Ambulate [RC] PER UNIT ROUTINE Care  11/21/17 20:11 Active


 


 Antiembolic Devices [RC] PER UNIT ROUTINE Care  11/21/17 20:12 Active


 


 Blood Glucose Check, Bedside [RC] BIDMEALS Care  11/21/17 20:10 Active


 


 Cardiac Monitoring [RC] CONTINUOUS Care  11/21/17 20:11 Active


 


 EKG 12 Lead [EKG Documentation Completion] [RC] ROUTINE Care  11/21/17 20:26 

Active


 


 HOB [Head of Bed Elevation] [RC] ASDIRECTED Care  11/21/17 20:30 Active


 


 Height and Weight [RC] DAILY Care  11/21/17 20:10 Active


 


 Intake and Output [RC] QSHIFT Care  11/21/17 20:11 Active


 


 Neuro Check [RC] BID Care  11/21/17 20:30 Active


 


 Oxygen Therapy [RC] PRN Care  11/21/17 20:10 Active


 


 Pulse Oximetry [RC] PRN Care  11/21/17 20:11 Active


 


 RT Aerosol Therapy [RC] ASDIRECTED Care  11/21/17 20:16 Active


 


 Up With Assistance [RC] ASDIRECTED Care  11/21/17 20:10 Active


 


 VTE/DVT Education [RC] PER UNIT ROUTINE Care  11/21/17 20:10 Active


 


 Vital Signs [RC] Q4H Care  11/21/17 20:10 Active


 


 Consult to Case Management [CONS] Routine Cons  11/21/17 20:10 Active


 


 Consult to  [CONS] Routine Cons  11/21/17 20:10 Active


 


 OT Evaluation and Treatment [CONS] Routine Cons  11/21/17 20:10 Active


 


 PT Evaluation and Treatment [CONS] Routine Cons  11/21/17 20:10 Active


 


 SLP Evaluation and Treatment [CONS] Routine Cons  11/21/17 20:10 Active


 


 ADA Diabetic [American Diabetic Association Diet] [DIET Diet  11/22/17 

Breakfast Active





 ]   


 


 Heart Healthy Diet [DIET] Diet  11/21/17 Breakfast Active


 


 Brain wo Cont [MR] Routine Exams  11/21/17 20:41 Stop Req


 


 Brain wo Cont [MR] Routine Exams  11/22/17 06:00 Ordered


 


 Carotid Comp [US] Routine Exams  11/21/17 20:31 Stop Req


 


 Carotid Comp [US] Routine Exams  11/22/17 06:00 Ordered


 


 Echo 2D wo Cont [US] Routine Exams  11/21/17 20:31 Stop Req


 


 Echo 2D wo Cont [US] Routine Exams  11/22/17 06:00 Ordered


 


 A1C [GLYCOSYLATED HEMOGLOBIN,HGBA1C] [CHEM] Routine Lab  11/22/17 05:11 Ordered


 


 BASIC METABOLIC PANEL,BMP [CHEM] AM Lab  11/22/17 05:11 Ordered


 


 BASIC METABOLIC PANEL,BMP [CHEM] AM Lab  11/23/17 05:11 Ordered


 


 BASIC METABOLIC PANEL,BMP [CHEM] AM Lab  11/24/17 05:11 Ordered


 


 BASIC METABOLIC PANEL,BMP [CHEM] AM Lab  11/25/17 05:11 Ordered


 


 CBC WITH AUTO DIFF [HEME] AM Lab  11/22/17 05:11 Ordered


 


 CBC WITH AUTO DIFF [HEME] AM Lab  11/23/17 05:11 Ordered


 


 CBC WITH AUTO DIFF [HEME] AM Lab  11/24/17 05:11 Ordered


 


 CBC WITH AUTO DIFF [HEME] AM Lab  11/25/17 05:11 Ordered


 


 LIPID PANEL [CHEM] Routine Lab  11/22/17 05:11 Ordered


 


 MAGNESIUM [CHEM] AM Lab  11/22/17 05:11 Ordered


 


 MAGNESIUM [CHEM] AM Lab  11/23/17 05:11 Ordered


 


 MAGNESIUM [CHEM] AM Lab  11/24/17 05:11 Ordered


 


 MAGNESIUM [CHEM] AM Lab  11/25/17 05:11 Ordered


 


 MAGNESIUM [CHEM] Routine Lab  11/21/17 15:25 Received


 


 Acetaminophen [Tylenol] Med  11/21/17 20:10 Active





 650 mg PO Q4H PRN   


 


 Albuterol/Ipratropium [DuoNeb 3.0-0.5 MG/3 ML] Med  11/21/17 20:10 Active





 3 ml NEB Q4H PRN   


 


 Bisacodyl [Dulcolax] Med  11/21/17 20:10 Active





 5 mg PO DAILY PRN   


 


 Docusate Sodium [Colace] Med  11/21/17 20:10 Active





 100 mg PO BID PRN   


 


 Docusate Sodium/Sennosides [Senna Plus] Med  11/21/17 20:10 Active





 1 tab PO BID PRN   


 


 Ondansetron [Zofran ODT] Med  11/21/17 20:10 Active





 4 mg PO Q6H PRN   


 


 Ondansetron [Zofran] Med  11/21/17 20:10 Active





 4 mg IV Q6H PRN   


 


 Polyethylene Glycol 3350 [MiraLAX] Med  11/21/17 20:10 Active





 17 gm PO DAILY PRN   


 


 Sodium Chloride 0.9% [Normal Saline] 1,000 ml Med  11/21/17 20:15 Active





 IV ASDIRECTED   


 


 Antiembolic Hose [OM.PC] Per Unit Routine Oth  11/21/17 20:11 Ordered


 


 Precautions [COMM] Routine Oth  11/21/17 20:36 Ordered


 


 Resuscitation Status Routine Resus Stat  11/21/17 20:10 Ordered








 Medication Orders





Acetaminophen (Tylenol)  650 mg PO Q4H PRN


   PRN Reason: Pain (Mild 1-3)/fever


Albuterol/Ipratropium (Duoneb 3.0-0.5 Mg/3 Ml)  3 ml NEB Q4H PRN


   PRN Reason: Shortness Of Breath/wheezing


Bisacodyl (Dulcolax)  5 mg PO DAILY PRN


   PRN Reason: Constipation


Docusate Sodium (Colace)  100 mg PO BID PRN


   PRN Reason: Constipation


Sodium Chloride (Normal Saline)  1,000 mls @ 100 mls/hr IV ASDIRECTED HEIDY


Ondansetron HCl (Zofran)  4 mg IV Q6H PRN


   PRN Reason: Nausea/Vomiting


Ondansetron HCl (Zofran Odt)  4 mg PO Q6H PRN


   PRN Reason: nausea, able to take PO


Polyethylene Glycol (Miralax)  17 gm PO DAILY PRN


   PRN Reason: Constipation


Senna/Docusate Sodium (Senna Plus)  1 tab PO BID PRN


   PRN Reason: Constipation








Assessment/Plan Comment:: 


I/P:





Acute:





TIA 


   -Nursing home staff reports right sided weakness


   -Ambulance and ED staff report weakness has subsided and returned to 

baseline 


   -Advanced dementia; able to follow simple commands; difficult to assess 


   -Head CT in ED shows nothing acute; senescent changes noted; this is 

compared to prior head CT study performed 11/11/17


   -Neuro exam in ED and on floor essentially unremarkable


   -Ambulating well now


   -12 lead obtained - NSR with rate of 86. Inverted T waves in III, V1. Q 

waves in III, aVF. 


   -Pt 12.0; INR 1.09; APTT 29


   -UA negative (However trace protein, 1+ ketones, and 5-10 hyaline casts noted

) 


   -Carotid ultrasound ordered


   -Echo ordered


   -Brain MRI ordered


   -Lipid panel ordered


   -BID neuro checks per nursing


   -HOB elevated 30 degrees


   -SLP swallow study in AM 


   


Failure to thrive


   -Past ED notes reveal dehydration and multiple falls 


   -Labs today suggest some mild dehydration as well


   -IV fluids ordered


   -PT/OT 





Hypomagnesemia


   -Magnesium 1.6


   -Ordered 2 gram IV replacement


   -Continue to monitor





3 mm lung nodule


   -Noted on CT scan within right middle lobe


   -Pt. is former smoker of 40 years but quit


   -Follow-up CT recommended in one year by Dr. Coppola, Radiologist.





Chronic:


Type II DM - Reportedly SNF checks blood sugar weekly; daily checks ordered; 

A1C ordered; Continue home meds


Glaucoma


Dry eye syndrome 


Chronic constipation 


HLD


HTN 


GERD


Alzheimers disease


Dimentia


Schizophrenia


Hypothyroidism - TSH ordered


Obesity





Plan:


Admit to medical floor on telemetry


CM/SW for discharge planning


PT/OT


DVT prophylaxis: FRANSISCA Hose


GI prophylaxis - Pepcid


Home medications as ordered


Other orders as listed above


Routine AM labs 





HIGH FALL RISK





Code status: DNR/DNI. Her daughter is medical POA. Her PCP is Dr. Yao at 

[facility] here in [place].

## 2017-11-21 NOTE — CT
Head CT

 

Technique: Multiple axial sections through the brain were obtained.  

Intravenous contrast was not utilized.

 

Comparison: Prior head CT study of 11/11/17.

 

Findings: Ventricles along with basal cisterns and sulci over the 

convexities are mildly prominent.  Mild diminished density is noted 

within the periventricular white matter compatible with small vessel 

ischemic demyelination change which appears fairly stable from prior 

exam.  No other abnormal parenchymal densities are seen.  No evidence 

of intracranial hemorrhage.  No midline shift or mass effect is seen. 

 Atherosclerotic calcification is seen within the vertebral vessels 

and within the carotid siphon.

 

Visualized sinuses are clear.  No acute calvarial abnormality is 

appreciated.

 

Impression:

1.  Senescent change as described above.  No acute intracranial 

abnormality is seen.  No significant change from previous study.

 

Diagnostic code #2

## 2017-11-22 RX ADMIN — DORZOLAMIDE HYDROCHLORIDE SCH: 20 SOLUTION/ DROPS OPHTHALMIC at 14:42

## 2017-11-22 RX ADMIN — HYPROMELLOSE 2910 SCH DROP: 5 SOLUTION OPHTHALMIC at 14:43

## 2017-11-22 RX ADMIN — HYPROMELLOSE 2910 SCH DROP: 5 SOLUTION OPHTHALMIC at 08:40

## 2017-11-22 RX ADMIN — TIMOLOL MALEATE SCH: 2.5 SOLUTION OPHTHALMIC at 04:06

## 2017-11-22 RX ADMIN — TIMOLOL MALEATE SCH: 2.5 SOLUTION OPHTHALMIC at 14:42

## 2017-11-22 RX ADMIN — DORZOLAMIDE HYDROCHLORIDE SCH: 20 SOLUTION/ DROPS OPHTHALMIC at 22:51

## 2017-11-22 RX ADMIN — TIMOLOL MALEATE SCH: 2.5 SOLUTION OPHTHALMIC at 22:41

## 2017-11-22 RX ADMIN — HYPROMELLOSE 2910 SCH: 5 SOLUTION OPHTHALMIC at 18:18

## 2017-11-22 RX ADMIN — DORZOLAMIDE HYDROCHLORIDE SCH: 20 SOLUTION/ DROPS OPHTHALMIC at 04:06

## 2017-11-22 RX ADMIN — HYPROMELLOSE 2910 SCH DROP: 5 SOLUTION OPHTHALMIC at 22:36

## 2017-11-22 NOTE — PCM.PN
- General Info


Date of Service: 11/22/17


Subjective Update: 











Requires sedation for noninvasive testing; follows direction but unable to 

initiate independent activity


Functional Status: Reports: Pain Controlled, Tolerating Diet, Ambulating, 

Urinating





- Review of Systems


General: Reports: No Symptoms


HEENT: Reports: No Symptoms


Pulmonary: Reports: No Symptoms


Cardiovascular: Reports: No Symptoms


Gastrointestinal: Reports: No Symptoms


Genitourinary: Reports: No Symptoms


Musculoskeletal: Reports: No Symptoms


Skin: Reports: No Symptoms


Neurological: Reports: No Symptoms


Psychiatric: Reports: No Symptoms





- Patient Data


Vitals - Most Recent: 


 Last Vital Signs











Temp  36.9 C   11/22/17 07:35


 


Pulse  75   11/22/17 08:39


 


Resp  20   11/22/17 07:35


 


BP  128/87   11/22/17 08:39


 


Pulse Ox  97   11/22/17 07:35











Weight - Most Recent: 78.335 kg


I&O - Last 24 Hours: 


 Intake & Output











 11/21/17 11/22/17 11/22/17





 22:59 06:59 14:59


 


Intake Total  300 120


 


Output Total  100 


 


Balance  200 120











Lab Results Last 24 Hours: 


 Laboratory Results - last 24 hr











  11/21/17 11/22/17 11/22/17 Range/Units





  22:19 06:15 06:15 


 


WBC   7.90   (3.98-10.04)  K/mm3


 


RBC   3.70 L   (3.98-5.22)  M/mm3


 


Hgb   11.7   (11.2-15.7)  gm/L


 


Hct   34.2   (34.1-44.9)  %


 


MCV   92.4   (79.4-94.8)  fl


 


MCH   31.6   (25.6-32.2)  pg


 


MCHC   34.2   (32.2-35.5)  g/dl


 


RDW Std Deviation   44.4   (36.4-46.3)  fL


 


Plt Count   178 L   (182-369)  K/mm3


 


MPV   12.1   (9.4-12.3)  fl


 


Neut % (Auto)   58.5   (34.0-71.1)  %


 


Lymph % (Auto)   32.9   (19.3-51.7)  %


 


Mono % (Auto)   7.3   (4.7-12.5)  %


 


Eos % (Auto)   1.1   (0.7-5.8)  


 


Baso % (Auto)   0.1   (0.1-1.2)  %


 


Neut # (Auto)   4.61   (1.56-6.13)  K/mm3


 


Lymph # (Auto)   2.60   (1.18-3.74)  K/mm3


 


Mono # (Auto)   0.58 H   (0.24-0.36)  K/mm3


 


Eos # (Auto)   0.09   (0.04-0.36)  K/mm3


 


Baso # (Auto)   0.01   (0.01-0.08)  K/mm3


 


Sodium    142  (136-145)  mEq/L


 


Potassium    3.9  (3.5-5.1)  mEq/L


 


Chloride    110 H  ()  mEq/L


 


Carbon Dioxide    25  (21-32)  mEq/L


 


Anion Gap    10.9  (5-15)  


 


BUN    20 H  (7-18)  mg/dL


 


Creatinine    1.0  (0.55-1.02)  mg/dL


 


Est Cr Clr Drug Dosing    38.75  mL/min


 


Estimated GFR (MDRD)    53  (>60)  mL/min


 


BUN/Creatinine Ratio    20.0 H  (14-18)  


 


Glucose    104  ()  mg/dL


 


POC Glucose     ()  mg/dL


 


Hemoglobin A1c     (4.50-6.20)  %


 


Calcium    9.4  (8.5-10.1)  mg/dL


 


Magnesium    2.0  (1.8-2.4)  mg/dl


 


Triglycerides    106  (<150)  mg/dL


 


Cholesterol    134  (<200)  mg/dL


 


LDL Cholesterol Direct    70  (<100)  mg/dL


 


HDL Cholesterol    42.0  (40-59)  mg/dL


 


TSH 3rd Generation    0.581  (0.358-3.74)  uIU/mL


 


MRSA (PCR)  Negative    














  11/22/17 11/22/17 Range/Units





  06:15 07:15 


 


WBC    (3.98-10.04)  K/mm3


 


RBC    (3.98-5.22)  M/mm3


 


Hgb    (11.2-15.7)  gm/L


 


Hct    (34.1-44.9)  %


 


MCV    (79.4-94.8)  fl


 


MCH    (25.6-32.2)  pg


 


MCHC    (32.2-35.5)  g/dl


 


RDW Std Deviation    (36.4-46.3)  fL


 


Plt Count    (182-369)  K/mm3


 


MPV    (9.4-12.3)  fl


 


Neut % (Auto)    (34.0-71.1)  %


 


Lymph % (Auto)    (19.3-51.7)  %


 


Mono % (Auto)    (4.7-12.5)  %


 


Eos % (Auto)    (0.7-5.8)  


 


Baso % (Auto)    (0.1-1.2)  %


 


Neut # (Auto)    (1.56-6.13)  K/mm3


 


Lymph # (Auto)    (1.18-3.74)  K/mm3


 


Mono # (Auto)    (0.24-0.36)  K/mm3


 


Eos # (Auto)    (0.04-0.36)  K/mm3


 


Baso # (Auto)    (0.01-0.08)  K/mm3


 


Sodium    (136-145)  mEq/L


 


Potassium    (3.5-5.1)  mEq/L


 


Chloride    ()  mEq/L


 


Carbon Dioxide    (21-32)  mEq/L


 


Anion Gap    (5-15)  


 


BUN    (7-18)  mg/dL


 


Creatinine    (0.55-1.02)  mg/dL


 


Est Cr Clr Drug Dosing    mL/min


 


Estimated GFR (MDRD)    (>60)  mL/min


 


BUN/Creatinine Ratio    (14-18)  


 


Glucose    ()  mg/dL


 


POC Glucose   128 H  ()  mg/dL


 


Hemoglobin A1c  5.30   (4.50-6.20)  %


 


Calcium    (8.5-10.1)  mg/dL


 


Magnesium    (1.8-2.4)  mg/dl


 


Triglycerides    (<150)  mg/dL


 


Cholesterol    (<200)  mg/dL


 


LDL Cholesterol Direct    (<100)  mg/dL


 


HDL Cholesterol    (40-59)  mg/dL


 


TSH 3rd Generation    (0.358-3.74)  uIU/mL


 


MRSA (PCR)    











Med Orders - Current: 


 Current Medications





Acetaminophen (Tylenol)  650 mg PO Q4H PRN


   PRN Reason: Pain (Mild 1-3)/fever


Albuterol/Ipratropium (Duoneb 3.0-0.5 Mg/3 Ml)  3 ml NEB Q4H PRN


   PRN Reason: Shortness Of Breath/wheezing


Artificial Tears (Isopto Tears 0.5% Ophth Soln)  0 ml [facility]YEBOTH QID [facility]


   Last Admin: 11/22/17 08:40 Dose:  1 drop


Betamethasone/Clotrimazole (Lotrisone)  0 gm TOP BID PRN


   PRN Reason: Rash


Bisacodyl (Dulcolax)  5 mg PO DAILY PRN


   PRN Reason: Constipation


Brimonidine Tartrate (Alphagan 0.2% Ophth Soln)  0 ml EYEBOTH BID [facility]


   Last Admin: 11/22/17 08:40 Dose:  1 drop


Colesevelam HCl (Welchol)  1,875 mg PO BID [facility]


   Last Admin: 11/22/17 08:38 Dose:  1,875 mg


Docusate Sodium (Colace)  100 mg PO BID PRN


   PRN Reason: Constipation


Dorzolamide HCl (Trusopt 2% Ophth Soln)  0 ml EYELF BID [facility]


   Last Admin: 11/22/17 04:06 Dose:  Not Given


Famotidine (Pepcid)  20 mg PO DAILY [facility]


   Last Admin: 11/22/17 08:38 Dose:  20 mg


Sodium Chloride (Normal Saline)  1,000 mls @ 100 mls/hr IV ASDIRECTED [facility]


   Last Admin: 11/22/17 08:54 Dose:  100 mls/hr


Latanoprost (Xalatan 0.005% Ophth Soln)  0 ml EYEBOTH BEDTIME [facility]


Levothyroxine Sodium (Levothyroxine)  150 mcg PO ACBREAKFAST [facility]


   Last Admin: 11/22/17 06:36 Dose:  150 mcg


Memantine (Namenda)  5 mg PO BID [facility]


   Last Admin: 11/22/17 08:38 Dose:  5 mg


Metformin HCl (Glucophage)  500 mg PO BIDMEALS [facility]


   Last Admin: 11/22/17 06:36 Dose:  500 mg


Metoprolol Tartrate (Lopressor)  50 mg PO DAILY [facility]


   Last Admin: 11/22/17 08:39 Dose:  50 mg


Metoprolol Tartrate (Lopressor)  25 mg PO DAILY@2100 [facility]


Olanzapine (Zyprexa)  15 mg PO BEDTIME [facility]


Ondansetron HCl (Zofran)  4 mg IV Q6H PRN


   PRN Reason: Nausea/Vomiting


Ondansetron HCl (Zofran Odt)  4 mg PO Q6H PRN


   PRN Reason: nausea, able to take PO


Polyethylene Glycol (Miralax)  17 gm PO DAILY PRN


   PRN Reason: Constipation


Senna/Docusate Sodium (Senna Plus)  1 tab PO BID PRN


   PRN Reason: Constipation


Timolol Maleate (Timoptic 0.25% Ophth Soln)  0 ml EYELF BID [facility]


   Last Admin: 11/22/17 04:06 Dose:  Not Given





Discontinued Medications





Haloperidol Lactate (Haldol)  0.5 mg IVPUSH ONETIME ONE


   Stop: 11/22/17 09:22


   Last Admin: 11/22/17 09:39 Dose:  0.5 mg


Lactated Ringer's (Ringers, Lactated)  500 mls @ 999 mls/hr IV .BOLUS ONE


   Stop: 11/21/17 15:30


   Last Admin: 11/21/17 15:24 Dose:  999 mls/hr


Magnesium Sulfate 2 gm/ Premix  50 mls @ 25 mls/hr IV ONETIME ONE


   Stop: 11/21/17 22:58


   Last Admin: 11/21/17 21:52 Dose:  25 mls/hr


Lorazepam (Ativan)  0.5 mg IVPUSH ONETIME ONE


   Stop: 11/22/17 09:23


   Last Admin: 11/22/17 09:40 Dose:  0.5 mg


Metoprolol Tartrate (Lopressor)  50 mg PO BID [facility]


   Last Admin: 11/21/17 23:59 Dose:  50 mg


Non-Formulary Medication (Brimonidine Tartrate)  1 drop EYEBOTH BID [facility]


Non-Formulary Medication (Memantine Hcl)  7 mg PO DAILY [facility]











- Exam


Quality Assessment: DVT Prophylaxis


General: Alert, Oriented, Cooperative, No Acute Distress


HEENT: Pupils Equal, Pupils Reactive, EOMI


Neck: Trachea Midline, No JVD


Lungs: Normal Respiratory Effort


Cardiovascular: Regular Rate


GI/Abdominal Exam: Normal Bowel Sounds, Soft, Non-Tender, No Organomegaly, No 

Distention


 (Female) Exam: Deferred


Back Exam: Normal Inspection


Extremities: Normal Inspection


Skin: Warm


Neurological: No New Focal Deficit


Psy/Mental Status: Alert, Normal Affect, Normal Mood





- Problem List Review


Problem List Initiated/Reviewed/Updated: Yes





- Plan


Plan:: 


I/P:





Acute:





TIA 


   -Nursing home staff reports right sided weakness


   -Ambulance and ED staff report weakness has subsided and returned to 

baseline 


   -Advanced dementia; able to follow simple commands; difficult to assess 


   -Head CT in ED shows nothing acute; senescent changes noted; this is 

compared to prior head CT study performed 11/11/17


   -Neuro exam in ED and on floor essentially unremarkable


   -Ambulating well now


   -12 lead obtained - NSR with rate of 86. Inverted T waves in III, V1. Q 

waves in III, aVF. 


   -Pt 12.0; INR 1.09; APTT 29


   -UA negative (However trace protein, 1+ ketones, and 5-10 hyaline casts noted

) 


   -Carotid ultrasound ordered


   -Echo 


   -Brain MRI 


   -Lipid panel 


   -BID neuro checks per nursing


   -HOB elevated 30 degrees


   -SLP swallow study in AM 


   


Agitation/restlessness pre diagnostic studies





Failure to thrive


   -Past ED notes reveal dehydration and multiple falls 


   -Labs today suggest some mild dehydration as well


   -IV fluids ordered


   -PT/OT 





Hypomagnesemia


   -Replace as needed


   -Continue to monitor





3 mm lung nodule


   -Noted on CT scan within right middle lobe


   -Pt. is former smoker of 40 years but quit


   -Follow-up CT recommended in one year (if a smoker) by Dr. Coppola, 

Radiologist.








Chronic:


Type II DM - Reportedly SNF checks blood sugar weekly; daily checks ordered; 

A1C ordered; Continue home meds


Glaucoma


Dry eye syndrome 


Chronic constipation 


HLD


HTN 


GERD


Alzheimers disease


Dimentia


Schizophrenia


Hypothyroidism - TSH ordered


Obesity








Plan:





CVA/TIA protocol


CM/SW for discharge planning


PT/OT


DVT prophylaxis: FRANSISCA Hose


GI prophylaxis - Pepcid


Home medications as ordered


Other orders as listed above


Routine AM labs 





HIGH FALL RISK





Code status: DNR/DNI. Her daughter is medical POA. Her PCP is Dr. Yao at 

[facility] in [place].

## 2017-11-22 NOTE — MR
MRI brain

 

Technique: T1 sagittal; T2, T2 FLAIR, T1 and diffusion axial; T1 FLAIR

 coronal images were obtained.

 

Findings: I do not see any definite abnormal diffusion abnormalities. 

 Ventricles along with basal cisterns and sulci with convexities are 

mildly prominent.  Scattered areas of increased signal are seen within

 the periventricular white matter as well as subcortical white matter 

compatible with small vessel ischemic demyelination change.  Increased

 signal is also seen within the cortical regions within the left 

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

  No other abnormal signal is seen within the brain parenchyma.  No 

midline shift or mass effect is seen.

 

Impression:

1.  Senescent change as described above.  No acute diffusion 

abnormalities are seen.

 

Diagnostic code #2

## 2017-11-22 NOTE — US
Carotid ultrasound: Duplex and color flow imaging was obtained of the 

carotid arteries.

 

Comparison: No previous carotid imaging.

 

Technologist's note: Agitated and uncooperative patient

 

Mild amount of scattered plaque is seen on both sides.

 

Velocity measurements:

Right side:

CCA has a peak systolic velocity of 0.61 m/s.

ICA has a peak systolic velocity of 0.90 m/s and peak end-diastolic 

velocity of 0.24 m/s.

ECA has a peak systolic velocity of 1.02 m/s.

Vertebral artery has a peak systolic velocity of 0.33 m/s.

ICA/CCA ratio is 1.47.

 

Left side:

CCA has a peak systolic velocity of 0.88 m/s.

ICA has a peak systolic velocity of 0.69 m/s and peak end-diastolic 

velocity of 0.23 m/s.

ECA has a peak systolic velocity of 0.76 m/s.

Vertebral artery has a peak systolic velocity of 0.46 m/s.

ICA/CCA ratio is 0.78.

 

Impression:

1.  Mild amount of scattered plaque.

2.  Velocity measurements within both internal carotid arteries 

correspond to stenosis in the range of 1-49%.

 

Diagnostic code #2

## 2017-11-23 RX ADMIN — TIMOLOL MALEATE SCH: 2.5 SOLUTION OPHTHALMIC at 10:54

## 2017-11-23 RX ADMIN — HYPROMELLOSE 2910 SCH DROP: 5 SOLUTION OPHTHALMIC at 18:10

## 2017-11-23 RX ADMIN — LORAZEPAM PRN MG: 2 INJECTION INTRAMUSCULAR; INTRAVENOUS at 00:21

## 2017-11-23 RX ADMIN — HYPROMELLOSE 2910 SCH DROP: 5 SOLUTION OPHTHALMIC at 22:10

## 2017-11-23 RX ADMIN — DORZOLAMIDE HYDROCHLORIDE SCH: 20 SOLUTION/ DROPS OPHTHALMIC at 10:54

## 2017-11-23 RX ADMIN — HYPROMELLOSE 2910 SCH DROP: 5 SOLUTION OPHTHALMIC at 10:52

## 2017-11-23 RX ADMIN — HYPROMELLOSE 2910 SCH: 5 SOLUTION OPHTHALMIC at 14:35

## 2017-11-23 RX ADMIN — DORZOLAMIDE HYDROCHLORIDE SCH DROP: 20 SOLUTION/ DROPS OPHTHALMIC at 22:00

## 2017-11-23 RX ADMIN — ALENDRONATE SODIUM SCH DROP: 70 TABLET ORAL at 22:05

## 2017-11-23 RX ADMIN — HYPROMELLOSE 2910 SCH: 5 SOLUTION OPHTHALMIC at 12:23

## 2017-11-23 NOTE — PCM.PN
- General Info


Date of Service: 11/23/17


Functional Status: Reports: Tolerating Diet, Ambulating, Urinating





- Review of Systems


General: Reports: No Symptoms


HEENT: Reports: No Symptoms


Pulmonary: Reports: No Symptoms


Cardiovascular: Reports: No Symptoms


Gastrointestinal: Reports: No Symptoms


Genitourinary: Reports: No Symptoms


Musculoskeletal: Reports: No Symptoms


Skin: Reports: No Symptoms


Neurological: Reports: No Symptoms


Psychiatric: Reports: No Symptoms





- Patient Data


Vitals - Most Recent: 


 Last Vital Signs











Temp  36.2 C   11/23/17 07:19


 


Pulse  72   11/23/17 10:50


 


Resp  20   11/23/17 07:19


 


BP  116/57 L  11/23/17 10:50


 


Pulse Ox  100   11/23/17 07:16











Weight - Most Recent: 78.335 kg


I&O - Last 24 Hours: 


 Intake & Output











 11/22/17 11/23/17 11/23/17





 22:59 06:59 14:59


 


Intake Total 250 1160 120


 


Output Total 500  


 


Balance -250 1160 120











Lab Results Last 24 Hours: 


 Laboratory Results - last 24 hr











  11/22/17 11/23/17 11/23/17 Range/Units





  06:15 07:00 07:00 


 


WBC   6.51   (3.98-10.04)  K/mm3


 


RBC   3.77 L   (3.98-5.22)  M/mm3


 


Hgb   11.9   (11.2-15.7)  gm/L


 


Hct   34.7   (34.1-44.9)  %


 


MCV   92.0   (79.4-94.8)  fl


 


MCH   31.6   (25.6-32.2)  pg


 


MCHC   34.3   (32.2-35.5)  g/dl


 


RDW Std Deviation   45.3   (36.4-46.3)  fL


 


Plt Count   169 L   (182-369)  K/mm3


 


MPV   12.1   (9.4-12.3)  fl


 


Neut % (Auto)   48.9   (34.0-71.1)  %


 


Lymph % (Auto)   39.8   (19.3-51.7)  %


 


Mono % (Auto)   8.6   (4.7-12.5)  %


 


Eos % (Auto)   2.2   (0.7-5.8)  


 


Baso % (Auto)   0.3   (0.1-1.2)  %


 


Neut # (Auto)   3.19   (1.56-6.13)  K/mm3


 


Lymph # (Auto)   2.59   (1.18-3.74)  K/mm3


 


Mono # (Auto)   0.56 H   (0.24-0.36)  K/mm3


 


Eos # (Auto)   0.14   (0.04-0.36)  K/mm3


 


Baso # (Auto)   0.02   (0.01-0.08)  K/mm3


 


Sodium    143  (136-145)  mEq/L


 


Potassium    3.6  (3.5-5.1)  mEq/L


 


Chloride    111 H  ()  mEq/L


 


Carbon Dioxide    24  (21-32)  mEq/L


 


Anion Gap    11.6  (5-15)  


 


BUN    15  (7-18)  mg/dL


 


Creatinine    1.0  (0.55-1.02)  mg/dL


 


Est Cr Clr Drug Dosing    38.75  mL/min


 


Estimated GFR (MDRD)    53  (>60)  mL/min


 


BUN/Creatinine Ratio    15.0  (14-18)  


 


Glucose    88  ()  mg/dL


 


Calcium    8.8  (8.5-10.1)  mg/dL


 


Magnesium    1.7 L  (1.8-2.4)  mg/dl


 


C-Reactive Protein     (<1.0)  mg/dL


 


Mycoplasma pneumon IgM  Negative    (NEGATIVE)  














  11/23/17 Range/Units





  07:00 


 


WBC   (3.98-10.04)  K/mm3


 


RBC   (3.98-5.22)  M/mm3


 


Hgb   (11.2-15.7)  gm/L


 


Hct   (34.1-44.9)  %


 


MCV   (79.4-94.8)  fl


 


MCH   (25.6-32.2)  pg


 


MCHC   (32.2-35.5)  g/dl


 


RDW Std Deviation   (36.4-46.3)  fL


 


Plt Count   (182-369)  K/mm3


 


MPV   (9.4-12.3)  fl


 


Neut % (Auto)   (34.0-71.1)  %


 


Lymph % (Auto)   (19.3-51.7)  %


 


Mono % (Auto)   (4.7-12.5)  %


 


Eos % (Auto)   (0.7-5.8)  


 


Baso % (Auto)   (0.1-1.2)  %


 


Neut # (Auto)   (1.56-6.13)  K/mm3


 


Lymph # (Auto)   (1.18-3.74)  K/mm3


 


Mono # (Auto)   (0.24-0.36)  K/mm3


 


Eos # (Auto)   (0.04-0.36)  K/mm3


 


Baso # (Auto)   (0.01-0.08)  K/mm3


 


Sodium   (136-145)  mEq/L


 


Potassium   (3.5-5.1)  mEq/L


 


Chloride   ()  mEq/L


 


Carbon Dioxide   (21-32)  mEq/L


 


Anion Gap   (5-15)  


 


BUN   (7-18)  mg/dL


 


Creatinine   (0.55-1.02)  mg/dL


 


Est Cr Clr Drug Dosing   mL/min


 


Estimated GFR (MDRD)   (>60)  mL/min


 


BUN/Creatinine Ratio   (14-18)  


 


Glucose   ()  mg/dL


 


Calcium   (8.5-10.1)  mg/dL


 


Magnesium   (1.8-2.4)  mg/dl


 


C-Reactive Protein  < 0.2  (<1.0)  mg/dL


 


Mycoplasma pneumon IgM   (NEGATIVE)  











Med Orders - Current: 


 Current Medications





Acetaminophen (Tylenol)  650 mg PO Q4H PRN


   PRN Reason: Pain (Mild 1-3)/fever


Albuterol/Ipratropium (Duoneb 3.0-0.5 Mg/3 Ml)  3 ml NEB Q4H PRN


   PRN Reason: Shortness Of Breath/wheezing


Artificial Tears (Isopto Tears 0.5% Ophth Soln)  0 ml EYEBOTH QID [facility]


   Last Admin: 11/23/17 12:23 Dose:  Not Given


Betamethasone/Clotrimazole (Lotrisone)  0 gm TOP BID PRN


   PRN Reason: Rash


Bisacodyl (Dulcolax)  5 mg PO DAILY PRN


   PRN Reason: Constipation


Brimonidine Tartrate (Alphagan 0.2% Ophth Soln)  0 ml EYEBOTH BID [facility]


   Last Admin: 11/23/17 12:23 Dose:  Not Given


Colesevelam HCl (Welchol)  1,875 mg PO BID [facility]


   Last Admin: 11/23/17 12:22 Dose:  Not Given


Docusate Sodium (Colace)  100 mg PO BID PRN


   PRN Reason: Constipation


Dorzolamide HCl (Trusopt 2% Ophth Soln)  0 ml EYELF BID [facility]


   Last Admin: 11/23/17 10:54 Dose:  Not Given


Famotidine (Pepcid)  20 mg PO DAILY [facility]


   Last Admin: 11/23/17 10:51 Dose:  20 mg


Haloperidol Lactate (Haldol)  0.5 mg IVPUSH Q8H PRN


   PRN Reason: restlessness


Sodium Chloride (Normal Saline)  1,000 mls @ 75 mls/hr IV ASDIRECTED [facility]


   Last Admin: 11/23/17 13:00 Dose:  100 mls/hr


Latanoprost (Xalatan 0.005% Ophth Soln)  0 ml EYEBOTH BEDTIME [facility]


   Last Admin: 11/22/17 22:36 Dose:  1 drop


Levothyroxine Sodium (Levothyroxine)  150 mcg PO ACBREAKFAST [facility]


   Last Admin: 11/23/17 07:24 Dose:  Not Given


Lorazepam (Ativan)  0.5 mg IVPUSH Q8H PRN


   PRN Reason: Anxiety


   Last Admin: 11/23/17 00:21 Dose:  0.5 mg


Memantine (Namenda)  5 mg PO BID [facility]


   Last Admin: 11/23/17 10:52 Dose:  5 mg


Metformin HCl (Glucophage)  500 mg PO BIDMEALS [facility]


   Last Admin: 11/23/17 07:24 Dose:  Not Given


Metoprolol Tartrate (Lopressor)  50 mg PO DAILY [facility]


   Last Admin: 11/23/17 10:50 Dose:  50 mg


Metoprolol Tartrate (Lopressor)  25 mg PO DAILY@2100 [facility]


   Last Admin: 11/22/17 20:16 Dose:  25 mg


Olanzapine (Zyprexa)  15 mg PO BEDTIME [facility]


   Last Admin: 11/22/17 20:07 Dose:  15 mg


Ondansetron HCl (Zofran)  4 mg IV Q6H PRN


   PRN Reason: Nausea/Vomiting


Polyethylene Glycol (Miralax)  17 gm PO DAILY PRN


   PRN Reason: Constipation


Senna/Docusate Sodium (Senna Plus)  1 tab PO BID PRN


   PRN Reason: Constipation


Timolol Maleate (Timoptic 0.25% Ophth Soln)  0 ml EYELF BID [facility]


   Last Admin: 11/23/17 10:54 Dose:  Not Given





Discontinued Medications





Haloperidol Lactate (Haldol)  0.5 mg IVPUSH ONETIME ONE


   Stop: 11/22/17 09:22


   Last Admin: 11/22/17 09:39 Dose:  0.5 mg


Haloperidol Lactate (Haldol)  0.5 mg IVPUSH ONETIME ONE


   Stop: 11/22/17 14:33


   Last Admin: 11/22/17 14:38 Dose:  0.5 mg


Lactated Ringer's (Ringers, Lactated)  500 mls @ 999 mls/hr IV .BOLUS ONE


   Stop: 11/21/17 15:30


   Last Admin: 11/21/17 15:24 Dose:  999 mls/hr


Magnesium Sulfate 2 gm/ Premix  50 mls @ 25 mls/hr IV ONETIME ONE


   Stop: 11/21/17 22:58


   Last Admin: 11/21/17 21:52 Dose:  25 mls/hr


Lorazepam (Ativan)  0.5 mg IVPUSH ONETIME ONE


   Stop: 11/22/17 09:23


   Last Admin: 11/22/17 09:40 Dose:  0.5 mg


Lorazepam (Ativan)  0.5 mg IVPUSH ONETIME ONE


   Stop: 11/22/17 14:32


   Last Admin: 11/22/17 14:38 Dose:  0.5 mg


Metoprolol Tartrate (Lopressor)  50 mg PO BID [facility]


   Last Admin: 11/21/17 23:59 Dose:  50 mg


Non-Formulary Medication (Brimonidine Tartrate)  1 drop EYEBOTH BID [facility]


Non-Formulary Medication (Memantine Hcl)  7 mg PO DAILY [facility]


Ondansetron HCl (Zofran Odt)  4 mg PO Q6H PRN


   PRN Reason: nausea, able to take PO











- Exam


Quality Assessment: DVT Prophylaxis


General: Alert, Oriented, No Acute Distress


HEENT: Pupils Equal, Pupils Reactive, EOMI


Neck: Supple, Trachea Midline


Lungs: Normal Respiratory Effort


Cardiovascular: Regular Rate


GI/Abdominal Exam: Normal Bowel Sounds, Soft, Non-Tender, No Organomegaly, No 

Distention


 (Female) Exam: Deferred


Back Exam: Normal Inspection


Extremities: Normal Inspection, Pedal Edema


Skin: Warm


Neurological: No New Focal Deficit, Normal Speech


Psy/Mental Status: Alert, Normal Affect, Normal Mood





- Problem List Review


Problem List Initiated/Reviewed/Updated: Yes





- My Orders


Last 24 Hours: 


My Active Orders





11/22/17 16:23


RESPIRATORY PANEL BY PCR [MREF] Routine 





11/22/17 16:47


Haloperidol Lactate [Haldol]   0.5 mg IVPUSH Q8H PRN 





11/22/17 16:48


LORazepam [Ativan]   0.5 mg IVPUSH Q8H PRN 





11/22/17 18:00


STREP PNEUMONIAE ANTIGEN [MREF] Routine 





11/22/17 Dinner


National Dysphagia Diet [DIET] 





11/24/17 05:00


CRP [C-REACTIVE PROTEIN] [CHEM] DAILY 





11/25/17 05:00


CRP [C-REACTIVE PROTEIN] [CHEM] DAILY 





11/26/17 05:00


CRP [C-REACTIVE PROTEIN] [CHEM] DAILY 














- Plan


Plan:: 


I/P:





Acute:





TIA     -Nursing home staff reports right sided weakness


   -Ambulance and ED staff report weakness has subsided and returned to 

baseline 


   -Advanced dementia; able to follow simple commands; difficult to assess 


   -Neuro exam in ED and on floor essentially unremarkable


   -Ambulating well now


   -12 lead obtained - NSR with rate of 86. Inverted T waves in III, V1. Q 

waves in III, aVF. 


   -Pt 12.0; INR 1.09; APTT 29


   -UA negative (However trace protein, 1+ ketones, and 5-10 hyaline casts noted

) 


   -BID neuro checks per nursing


   -HOB elevated 30 degrees


   -SLP swallow study in AM 


   


Agitation/restlessness pre diagnostic studies





Failure to thrive


   -Past ED notes reveal dehydration and multiple falls 


   -Labs today suggest some mild dehydration as well


   -IV fluids ordered





Hypomagnesemia


   -Replace as needed


   -Continue to monitor





3 mm lung nodule


   -Noted on CT scan within right middle lobe


   -Pt. is former smoker of 40 years but quit


   -Follow-up CT recommended in one year (if a smoker) by Dr. Coppola, 

Radiologist.











Chronic:


Type II DM - Reportedly SNF checks blood sugar weekly; daily checks ordered; 

A1C ordered; Continue home meds


Glaucoma


Dry eye syndrome 


Chronic constipation 


HLD


HTN 


GERD


Alzheimers disease


Dimentia


Schizophrenia


Hypothyroidism - TSH ordered


Obesity











Plan:





CVA/TIA protocol


CM/SW for discharge planning


PT/OT


DVT prophylaxis: FRANSISCA Hose


GI prophylaxis - Pepcid


Home medications as ordered


Other orders as listed above


Routine AM labs 





HIGH FALL RISK


\





***Code status: DNR/DNI. Her daughter is medical POA. Her PCP is Dr. Yao at 

[facility] [facility] here in [place].

## 2017-11-24 VITALS — SYSTOLIC BLOOD PRESSURE: 122 MMHG | DIASTOLIC BLOOD PRESSURE: 54 MMHG

## 2017-11-24 RX ADMIN — DORZOLAMIDE HYDROCHLORIDE SCH: 20 SOLUTION/ DROPS OPHTHALMIC at 11:42

## 2017-11-24 RX ADMIN — HYPROMELLOSE 2910 SCH: 5 SOLUTION OPHTHALMIC at 11:42

## 2017-11-24 RX ADMIN — LORAZEPAM PRN MG: 2 INJECTION INTRAMUSCULAR; INTRAVENOUS at 08:49

## 2017-11-24 RX ADMIN — ALENDRONATE SODIUM SCH: 70 TABLET ORAL at 11:42

## 2018-01-21 ENCOUNTER — HOSPITAL ENCOUNTER (INPATIENT)
Dept: HOSPITAL 41 - JD.ED | Age: 83
LOS: 1 days | Discharge: NURSING, REHAB OR LONG-TERM CARE | DRG: 690 | End: 2018-01-22
Attending: INTERNAL MEDICINE | Admitting: INTERNAL MEDICINE
Payer: MEDICARE

## 2018-01-21 DIAGNOSIS — R53.1: ICD-10-CM

## 2018-01-21 DIAGNOSIS — Z88.8: ICD-10-CM

## 2018-01-21 DIAGNOSIS — E11.22: ICD-10-CM

## 2018-01-21 DIAGNOSIS — H40.9: ICD-10-CM

## 2018-01-21 DIAGNOSIS — H04.129: ICD-10-CM

## 2018-01-21 DIAGNOSIS — S80.02XA: ICD-10-CM

## 2018-01-21 DIAGNOSIS — N18.9: ICD-10-CM
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
## 2018-01-21 DIAGNOSIS — N39.0: Primary | ICD-10-CM

## 2018-01-21 DIAGNOSIS — Z79.82: ICD-10-CM

## 2018-01-21 DIAGNOSIS — E78.00: ICD-10-CM

## 2018-01-21 DIAGNOSIS — Z88.0: ICD-10-CM

## 2018-01-21 DIAGNOSIS — Z87.891: ICD-10-CM

## 2018-01-21 DIAGNOSIS — F20.9: ICD-10-CM

## 2018-01-21 DIAGNOSIS — E03.9: ICD-10-CM

## 2018-01-21 DIAGNOSIS — E66.9: ICD-10-CM

## 2018-01-21 DIAGNOSIS — E86.0: ICD-10-CM

## 2018-01-21 DIAGNOSIS — K21.9: ICD-10-CM

## 2018-01-21 DIAGNOSIS — F02.80: ICD-10-CM

## 2018-01-21 DIAGNOSIS — N17.9: ICD-10-CM

## 2018-01-21 DIAGNOSIS — I12.9: ICD-10-CM

## 2018-01-21 DIAGNOSIS — Z79.899: ICD-10-CM
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
## 2018-01-21 DIAGNOSIS — G30.9: ICD-10-CM

## 2018-01-21 DIAGNOSIS — M10.9: ICD-10-CM

## 2018-01-21 DIAGNOSIS — R41.82: ICD-10-CM

## 2018-01-21 DIAGNOSIS — S40.012A: ICD-10-CM

## 2018-01-21 PROCEDURE — 73030 X-RAY EXAM OF SHOULDER: CPT

## 2018-01-21 PROCEDURE — 96365 THER/PROPH/DIAG IV INF INIT: CPT

## 2018-01-21 PROCEDURE — 87086 URINE CULTURE/COLONY COUNT: CPT

## 2018-01-21 PROCEDURE — 83735 ASSAY OF MAGNESIUM: CPT

## 2018-01-21 PROCEDURE — 83605 ASSAY OF LACTIC ACID: CPT

## 2018-01-21 PROCEDURE — 81001 URINALYSIS AUTO W/SCOPE: CPT

## 2018-01-21 PROCEDURE — 82553 CREATINE MB FRACTION: CPT

## 2018-01-21 PROCEDURE — 87040 BLOOD CULTURE FOR BACTERIA: CPT

## 2018-01-21 PROCEDURE — 36415 COLL VENOUS BLD VENIPUNCTURE: CPT

## 2018-01-21 PROCEDURE — 71045 X-RAY EXAM CHEST 1 VIEW: CPT

## 2018-01-21 PROCEDURE — 82962 GLUCOSE BLOOD TEST: CPT

## 2018-01-21 PROCEDURE — 80053 COMPREHEN METABOLIC PANEL: CPT

## 2018-01-21 PROCEDURE — 93005 ELECTROCARDIOGRAM TRACING: CPT

## 2018-01-21 PROCEDURE — 99285 EMERGENCY DEPT VISIT HI MDM: CPT

## 2018-01-21 PROCEDURE — 73560 X-RAY EXAM OF KNEE 1 OR 2: CPT

## 2018-01-21 PROCEDURE — 85025 COMPLETE CBC W/AUTO DIFF WBC: CPT

## 2018-01-21 PROCEDURE — 85652 RBC SED RATE AUTOMATED: CPT

## 2018-01-21 PROCEDURE — 96361 HYDRATE IV INFUSION ADD-ON: CPT

## 2018-01-21 PROCEDURE — 85610 PROTHROMBIN TIME: CPT

## 2018-01-21 PROCEDURE — 87804 INFLUENZA ASSAY W/OPTIC: CPT

## 2018-01-21 PROCEDURE — 84484 ASSAY OF TROPONIN QUANT: CPT

## 2018-01-21 PROCEDURE — 86140 C-REACTIVE PROTEIN: CPT

## 2018-01-21 PROCEDURE — 96375 TX/PRO/DX INJ NEW DRUG ADDON: CPT

## 2018-01-21 PROCEDURE — 83880 ASSAY OF NATRIURETIC PEPTIDE: CPT

## 2018-01-21 RX ADMIN — LORAZEPAM PRN MG: 2 INJECTION INTRAMUSCULAR; INTRAVENOUS at 23:03

## 2018-01-21 NOTE — EDM.PDOC
ED HPI GENERAL MEDICAL PROBLEM





- General


Chief Complaint: Fever


Stated Complaint: [facility]


Time Seen by Provider: 01/21/18 14:03


Source of Information: Reports: EMS, Nursing Home Records


History Limitations: Reports: Altered Mental Status





- History of Present Illness


INITIAL COMMENTS - FREE TEXT/NARRATIVE: 





82-year-old female sent in to our hospital from [facility] 

where she resides. Patient apparently fell at the residence this morning 

landing on her knees. She was aided back up and seemed to be able to walk. She 

seemed to be somewhat confused and nurses felt that she was warm to palpation 

suggesting an infection as well. Patient has degenerative brain disease and is 

not able to provide any useful history. She is agitated easily and not very 

cooperative with nursing staff's ability to obtain her vital signs etc.


Onset: Today (Unclear when she fell.)


Onset Date: 01/21/18


Duration: Hour(s):


Location: Reports: Other (Bruise left knee and left shoulder.)


Quality: Reports: Other


Severity: Moderate (Patient is not able to state that she has hurt.)


Context: Reports: Trauma (Fall at nursing home where she resides. Landed on 

both knees.), Other.  Denies: Activity, Exercise, Lifting, Sick Contact


Associated Symptoms: Reports: Fever/Chills, Loss of Appetite, Malaise, Weakness 

(Generalized).  Denies: Nausea/Vomiting (Not eating very well.)





- Related Data


 Allergies











Allergy/AdvReac Type Severity Reaction Status Date / Time


 


atorvastatin Allergy  Other Verified 01/21/18 13:53


 


codeine Allergy  Other Verified 01/21/18 13:53


 


losartan Allergy  Other Verified 01/21/18 13:53


 


morphine Allergy  Other Verified 01/21/18 13:53


 


Penicillins Allergy  Other Verified 01/21/18 13:53











Home Meds: 


 Home Meds





Acetaminophen [Tylenol] 650 mg PO Q4H PRN 01/21/18 [History]


Aspirin 81 mg PO DAILY 01/21/18 [History]


Bisacodyl [Dulcolax] 10 mg RECTAL ASDIRECTED PRN 01/21/18 [History]


Calcium Carbonate [Tums] 200 mg PO DAILY PRN 01/21/18 [History]


Dextromethorphan/guaiFENesin [Robitussin DM] 10 ml PO QID PRN 01/21/18 [History]


Furosemide [Lasix] 20 mg PO DAILY 01/21/18 [History]


Levothyroxine 150 mcg PO DAILY 01/21/18 [History]


Memantine HCl [Namenda Xr] 7 mg PO DAILY 01/21/18 [History]


Metoprolol Tartrate 50 mg PO BID 01/21/18 [History]


OLANZapine [ZyPREXA Zydis] 15 mg PO BEDTIME 01/21/18 [History]


Polyethylene Glycol 3350 [MiraLAX] 17 gm PO Q2D 01/21/18 [History]


Sennosides [Senna] 1 tab PO DAILY PRN 01/21/18 [History]


Simvastatin [Zocor] 5 mg PO BEDTIME 01/21/18 [History]


Betamethasone/Clotrimazole [Lotrisone] 1 applic TOP BID PRN 01/22/18 [History]


Bisacodyl 30 ml RECTAL Q3D PRN 01/22/18 [History]


Brimonidine Tartrate [Alphagan P 0.1% Ophth Soln] 1 drop EYEBOTH BID 01/22/18 [

History]


Mineral/Min Oil/Desire/Wool Alcoh [Eucerin Creme] 1 applic TOP BID PRN 01/22/18 [

History]


Dextran 70/Hypromellose [Artificial Tears] 1 drop EYEBOTH Q4HR 01/22/18 [History

]


Dorzolamide [Trusopt 2% Ophth Soln] 1 drop EYELF BID 01/22/18 [History]


Dorzolamide [Trusopt 2% Ophth Soln] 10 ml EYELF BID 01/22/18 [History]


Latanoprost [Xalatan 0.005% Ophth Soln] 1 drop EYEBOTH BEDTIME 01/22/18 [History

]


Levofloxacin [Levaquin] 250 mg PO Q24H #5 tablet 01/22/18 [Rx]


Polyethylene Glycol 3350 [MiraLAX] 17 gm PO Q2D  packet 01/22/18 [Rx]


Timolol Maleate [Timoptic 0.5% Ophth Soln] 1 drop EYELF BID 01/22/18 [History]











Past Medical History


HEENT History: Reports: Glaucoma, Impaired Vision, Other (See Below)


Other HEENT History: dry eyes syndrome, wears eyeglasses.


Cardiovascular History: Reports: High Cholesterol, Hypertension


Gastrointestinal History: Reports: Chronic Constipation, GERD


Genitourinary History: Reports: Renal Disease, Other (See Below)


Other Genitourinary History: hx hyperglycemic--hyperosmolar coma ([facility])


OB/GYN History: Reports: Pregnancy


Musculoskeletal History: Reports: Gout


Other Musculoskeletal History: uses walker


Neurological History: Reports: Alzheimers Disease, Other (See Below)


Other Neuro History: encephalopathy, AMS


Psychiatric History: Reports: Alzheimers Disease, Dementia, Schizophrenia, 

Other (See Below)


Other Psychiatric History: delirium


Endocrine/Metabolic History: Reports: Diabetes, Type II, Hypothyroidism, Obesity

/BMI 30+





- Infectious Disease History


Infectious Disease History: Reports: Other (See Below)


Other Infectious Disease History: none per record. MRSA swab sent on this stay





- Past Surgical History


Other Endocrine Surgeries/Procedures: thyroid surgery


Neurological Surgical History: Reports: None





Social & Family History





- Family History


Family Medical History: Noncontributory





- Tobacco Use


Smoking Status *Q: Former Smoker


Used Tobacco, but Quit: Yes


Month Tobacco Last Used: 40 yrs


Second Hand Smoke Exposure: No





- Caffeine Use


Caffeine Use: Reports: Coffee





- Recreational Drug Use


Recreational Drug Use: No





- Living Situation & Occupation


Living situation: Reports: Extended Care Facility (Currently living in the 

[facility])


Occupation: Retired





ED ROS GENERAL





- Review of Systems


Review Of Systems: Unable To Obtain (Unable to obtain from the patient)





ED EXAM, SEPSIS





- Physical Exam


Exam: See Below (Unable to obtain from the patient due to organic brain disease.

)


Exam Limited By: Altered Mental Status (Confused and moderately combat of not 

very cooperative with getting vital signs and being examined.)


General Appearance: Moderate Distress (Anxious and somewhat agitated.)


Eye Exam: Bilateral Eye: Normal Inspection (No jaundice.)


Ears: Normal TMs


Throat/Mouth: Normal Voice, Other


Head: Atraumatic (Tongue is mildly dry and coated.), Normocephalic, Other (No 

outward signs of head trauma.)


Neck: Normal Inspection, Other (Mild crepitus on rotation but no restriction of 

range of motion.).  No: Supple, Limited Range of Motion


Respiratory/Chest: No Respiratory Distress, Lungs Clear, Normal Breath Sounds, 

No Accessory Muscle Use, Chest Non-Tender, Respiratory Distress, Other


Cardiovascular: Normal Peripheral Pulses (No obvious injuries to her chest wall.

), Regular Rate, Rhythm, No Murmur, Other (Mild dependent edema bilaterally.)


Peripheral Pulses: 0: Posterior Tibial (L), Posterior Tibial (R), Dorsalis 

Pedis (L), Dorsalis Pedis (R)


GI/Abdominal Exam: Normal Bowel Sounds, Soft, Non-Tender, No Organomegaly


Back: Normal Inspection, Full Range of Motion, Other (No outward signs of 

trauma with abrasions or contusions to the).  No: CVA Tenderness (L), CVA 

Tenderness (R)


Extremities: Other ( thoracic or lumbar spine. mild bruise just superior to the 

left patella noted on her left knee and she complains when I flex her knee and 

internally externally rotate her hip. The right leg shows minimal contusion to 

the patella but she has full unrestricted range of motion without much pain. 

She does have decreased external rotation of the hip indicating arthritis. Also 

a bruise over her left acromioclavicular joint that may or may not have come 

from recent fall as it may be a day or so.)


Neurological: No: Normal Cognition, Normal Gait, No Motor/Sensory Deficits


Psychiatric: Other


Skin: Warm (Somewhat agitated and anxious.), Dry, Intact, Normal Color, Other (

Feels quite warm to palpation and she may have a fever but nurses got normal 

temperature on skin check and 99.3 rectally.)





EKG INTERPRETATION


EKG Date: 01/21/18


Time: 14:50


Rhythm: NSR


Rate (Beats/Min): 80


Axis: LAD-Left Axis Deviation (Borderline)


P-Wave: Present


QRS: Other (Q waves from V1 to V5 suggesting an old large anteroseptal 

myocardial infarction. Decreased voltage in the precordial leads. Mild Q waves 

noted in leads 3 and aVF consider old inferior wall myocardial infarction as 

well.)


ST-T: Other (Mild diffuse early repolarization pattern.)


QT: Normal


EKG Interpretation Comments: 





Abnormal ECG.





Course





- Vital Signs


Last Recorded V/S: 


 Last Vital Signs











Temp  36.9 C   01/22/18 11:01


 


Pulse  93   01/22/18 11:01


 


Resp  18   01/22/18 11:01


 


BP  139/77   01/22/18 11:01


 


Pulse Ox  97   01/22/18 11:01














- Orders/Labs/Meds


Labs: 


 Laboratory Tests











  01/21/18 01/21/18 01/21/18 Range/Units





  14:00 14:00 14:00 


 


WBC  8.39    (3.98-10.04)  K/mm3


 


RBC  3.88 L    (3.98-5.22)  M/mm3


 


Hgb  12.2    (11.2-15.7)  gm/L


 


Hct  36.2    (34.1-44.9)  %


 


MCV  93.3    (79.4-94.8)  fl


 


MCH  31.4    (25.6-32.2)  pg


 


MCHC  33.7    (32.2-35.5)  g/dl


 


RDW Std Deviation  46.1    (36.4-46.3)  fL


 


Plt Count  191    (182-369)  K/mm3


 


MPV  11.5    (9.4-12.3)  fl


 


Neutrophils % (Manual)  71 H    (40-60)  %


 


Band Neutrophils %  0    (0-10)  %


 


Lymphocytes % (Manual)  20    (20-40)  %


 


Atypical Lymphs %  0    %


 


Monocytes % (Manual)  7    (2-10)  %


 


Eosinophils % (Manual)  2    (0.7-5.8)  %


 


Basophils % (Manual)  0 L    (0.1-1.2)  


 


Platelet Estimate  Adequate    


 


Anisocytosis  1+ slight    


 


Macrocytosis  1+ slight    


 


Ovalocytes  1+ slight    


 


RBC Morph Comment  Not Reportable    


 


ESR     (0-20)  mm/hr


 


PT   11.2   (8.0-13.0)  SECONDS


 


INR   1.03   


 


Sodium    145  (136-145)  mEq/L


 


Potassium    3.9  (3.5-5.1)  mEq/L


 


Chloride    109 H  ()  mEq/L


 


Carbon Dioxide    24  (21-32)  mEq/L


 


Anion Gap    15.9 H  (5-15)  


 


BUN    24 H  (7-18)  mg/dL


 


Creatinine    1.2 H  (0.55-1.02)  mg/dL


 


Est Cr Clr Drug Dosing    30.93  mL/min


 


Estimated GFR (MDRD)    43  (>60)  mL/min


 


BUN/Creatinine Ratio    20.0 H  (14-18)  


 


Glucose    86  ()  mg/dL


 


POC Glucose     ()  mg/dL


 


Lactic Acid     (0.4-2.0)  mmol/L


 


Calcium    9.6  (8.5-10.1)  mg/dL


 


Magnesium    1.9  (1.8-2.4)  mg/dl


 


Total Bilirubin    0.8  (0.2-1.0)  mg/dL


 


AST    19  (15-37)  U/L


 


ALT    16  (14-59)  U/L


 


Alkaline Phosphatase    43 L  ()  U/L


 


CK-MB (CK-2)    2.4  (0-3.6)  ng/ml


 


Troponin I    < 0.017  (0.00-0.056)  ng/mL


 


C-Reactive Protein    < 0.2  (<1.0)  mg/dL


 


NT-Pro-B Natriuret Pep    324  (0-450)  pg/mL


 


Total Protein    6.9  (6.4-8.2)  g/dl


 


Albumin    3.3 L  (3.4-5.0)  g/dl


 


Globulin    3.6  gm/dL


 


Albumin/Globulin Ratio    0.9 L  (1-2)  


 


Urine Color     (Yellow)  


 


Urine Appearance     (Clear)  


 


Urine pH     (5.0-8.0)  


 


Ur Specific Gravity     (1.005-1.030)  


 


Urine Protein     (Negative)  


 


Urine Glucose (UA)     (Negative)  


 


Urine Ketones     (Negative)  


 


Urine Occult Blood     (Negative)  


 


Urine Nitrite     (Negative)  


 


Urine Bilirubin     (Negative)  


 


Urine Urobilinogen     (0.2-1.0)  


 


Ur Leukocyte Esterase     (Negative)  


 


Urine RBC     (0-5)  /hpf


 


Urine WBC     (0-5)  /hpf


 


Ur Epithelial Cells     (0-5)  /hpf


 


Urine Bacteria     (FEW)  /hpf


 


Urine Mucus     (FEW)  /hpf














  01/21/18 01/21/18 01/21/18 Range/Units





  14:00 14:25 15:09 


 


WBC     (3.98-10.04)  K/mm3


 


RBC     (3.98-5.22)  M/mm3


 


Hgb     (11.2-15.7)  gm/L


 


Hct     (34.1-44.9)  %


 


MCV     (79.4-94.8)  fl


 


MCH     (25.6-32.2)  pg


 


MCHC     (32.2-35.5)  g/dl


 


RDW Std Deviation     (36.4-46.3)  fL


 


Plt Count     (182-369)  K/mm3


 


MPV     (9.4-12.3)  fl


 


Neutrophils % (Manual)     (40-60)  %


 


Band Neutrophils %     (0-10)  %


 


Lymphocytes % (Manual)     (20-40)  %


 


Atypical Lymphs %     %


 


Monocytes % (Manual)     (2-10)  %


 


Eosinophils % (Manual)     (0.7-5.8)  %


 


Basophils % (Manual)     (0.1-1.2)  


 


Platelet Estimate     


 


Anisocytosis     


 


Macrocytosis     


 


Ovalocytes     


 


RBC Morph Comment     


 


ESR  36 H    (0-20)  mm/hr


 


PT     (8.0-13.0)  SECONDS


 


INR     


 


Sodium     (136-145)  mEq/L


 


Potassium     (3.5-5.1)  mEq/L


 


Chloride     ()  mEq/L


 


Carbon Dioxide     (21-32)  mEq/L


 


Anion Gap     (5-15)  


 


BUN     (7-18)  mg/dL


 


Creatinine     (0.55-1.02)  mg/dL


 


Est Cr Clr Drug Dosing     mL/min


 


Estimated GFR (MDRD)     (>60)  mL/min


 


BUN/Creatinine Ratio     (14-18)  


 


Glucose     ()  mg/dL


 


POC Glucose    84  ()  mg/dL


 


Lactic Acid   1.3   (0.4-2.0)  mmol/L


 


Calcium     (8.5-10.1)  mg/dL


 


Magnesium     (1.8-2.4)  mg/dl


 


Total Bilirubin     (0.2-1.0)  mg/dL


 


AST     (15-37)  U/L


 


ALT     (14-59)  U/L


 


Alkaline Phosphatase     ()  U/L


 


CK-MB (CK-2)     (0-3.6)  ng/ml


 


Troponin I     (0.00-0.056)  ng/mL


 


C-Reactive Protein     (<1.0)  mg/dL


 


NT-Pro-B Natriuret Pep     (0-450)  pg/mL


 


Total Protein     (6.4-8.2)  g/dl


 


Albumin     (3.4-5.0)  g/dl


 


Globulin     gm/dL


 


Albumin/Globulin Ratio     (1-2)  


 


Urine Color     (Yellow)  


 


Urine Appearance     (Clear)  


 


Urine pH     (5.0-8.0)  


 


Ur Specific Gravity     (1.005-1.030)  


 


Urine Protein     (Negative)  


 


Urine Glucose (UA)     (Negative)  


 


Urine Ketones     (Negative)  


 


Urine Occult Blood     (Negative)  


 


Urine Nitrite     (Negative)  


 


Urine Bilirubin     (Negative)  


 


Urine Urobilinogen     (0.2-1.0)  


 


Ur Leukocyte Esterase     (Negative)  


 


Urine RBC     (0-5)  /hpf


 


Urine WBC     (0-5)  /hpf


 


Ur Epithelial Cells     (0-5)  /hpf


 


Urine Bacteria     (FEW)  /hpf


 


Urine Mucus     (FEW)  /hpf














  01/21/18 Range/Units





  15:10 


 


WBC   (3.98-10.04)  K/mm3


 


RBC   (3.98-5.22)  M/mm3


 


Hgb   (11.2-15.7)  gm/L


 


Hct   (34.1-44.9)  %


 


MCV   (79.4-94.8)  fl


 


MCH   (25.6-32.2)  pg


 


MCHC   (32.2-35.5)  g/dl


 


RDW Std Deviation   (36.4-46.3)  fL


 


Plt Count   (182-369)  K/mm3


 


MPV   (9.4-12.3)  fl


 


Neutrophils % (Manual)   (40-60)  %


 


Band Neutrophils %   (0-10)  %


 


Lymphocytes % (Manual)   (20-40)  %


 


Atypical Lymphs %   %


 


Monocytes % (Manual)   (2-10)  %


 


Eosinophils % (Manual)   (0.7-5.8)  %


 


Basophils % (Manual)   (0.1-1.2)  


 


Platelet Estimate   


 


Anisocytosis   


 


Macrocytosis   


 


Ovalocytes   


 


RBC Morph Comment   


 


ESR   (0-20)  mm/hr


 


PT   (8.0-13.0)  SECONDS


 


INR   


 


Sodium   (136-145)  mEq/L


 


Potassium   (3.5-5.1)  mEq/L


 


Chloride   ()  mEq/L


 


Carbon Dioxide   (21-32)  mEq/L


 


Anion Gap   (5-15)  


 


BUN   (7-18)  mg/dL


 


Creatinine   (0.55-1.02)  mg/dL


 


Est Cr Clr Drug Dosing   mL/min


 


Estimated GFR (MDRD)   (>60)  mL/min


 


BUN/Creatinine Ratio   (14-18)  


 


Glucose   ()  mg/dL


 


POC Glucose   ()  mg/dL


 


Lactic Acid   (0.4-2.0)  mmol/L


 


Calcium   (8.5-10.1)  mg/dL


 


Magnesium   (1.8-2.4)  mg/dl


 


Total Bilirubin   (0.2-1.0)  mg/dL


 


AST   (15-37)  U/L


 


ALT   (14-59)  U/L


 


Alkaline Phosphatase   ()  U/L


 


CK-MB (CK-2)   (0-3.6)  ng/ml


 


Troponin I   (0.00-0.056)  ng/mL


 


C-Reactive Protein   (<1.0)  mg/dL


 


NT-Pro-B Natriuret Pep   (0-450)  pg/mL


 


Total Protein   (6.4-8.2)  g/dl


 


Albumin   (3.4-5.0)  g/dl


 


Globulin   gm/dL


 


Albumin/Globulin Ratio   (1-2)  


 


Urine Color  Yellow  (Yellow)  


 


Urine Appearance  Clear  (Clear)  


 


Urine pH  5.5  (5.0-8.0)  


 


Ur Specific Gravity  1.025  (1.005-1.030)  


 


Urine Protein  Negative  (Negative)  


 


Urine Glucose (UA)  Negative  (Negative)  


 


Urine Ketones  Trace H  (Negative)  


 


Urine Occult Blood  Trace-lysed H  (Negative)  


 


Urine Nitrite  Positive H  (Negative)  


 


Urine Bilirubin  Negative  (Negative)  


 


Urine Urobilinogen  0.2  (0.2-1.0)  


 


Ur Leukocyte Esterase  Trace H  (Negative)  


 


Urine RBC  0-5  (0-5)  /hpf


 


Urine WBC  10-20 H  (0-5)  /hpf


 


Ur Epithelial Cells  0-5  (0-5)  /hpf


 


Urine Bacteria  Many H  (FEW)  /hpf


 


Urine Mucus  Few  (FEW)  /hpf











Meds: 


Medications














Discontinued Medications














Generic Name Dose Route Start Last Admin





  Trade Name Freq  PRN Reason Stop Dose Admin


 


Acetaminophen  650 mg  01/21/18 19:02  01/21/18 21:48





  Tylenol  PO   650 mg





  Q6H PRN   Administration





  Pain   


 


Aspirin  81 mg  01/22/18 09:00  01/22/18 09:28





  Aspirin  PO   81 mg





  DAILY HEIDY   Administration


 


Betamethasone/Clotrimazole  0 gm  01/22/18 11:56  





  Lotrisone  TOP   





  BID PRN   





  rash   


 


Bisacodyl  10 mg  01/22/18 11:56  





  Dulcolax  PO   





  ASDIRECTED PRN   





  Constipation   


 


Calcium Carbonate/Glycine  500 mg  01/22/18 11:56  





  Tums  PO   





  DAILY PRN   





  Heartburn   


 


Dorzolamide HCl  0 ml  01/22/18 21:00  





  Trusopt 2% Ophth Soln  EYELF   





  BID HEIDY   


 


Dorzolamide HCl  0 ml  01/22/18 21:00  





  Trusopt 2% Ophth Soln  EYELF   





  BID HEIDY   


 


Furosemide  20 mg  01/23/18 09:00  





  Lasix  PO   





  DAILY HEIDY   


 


Guaifenesin/Phenylephrine HCl  10 ml  01/22/18 11:56  





  Robitussin Dm  PO   





  QID PRN   





  Congestion   


 


Sodium Chloride  1,000 mls @ 100 mls/hr  01/21/18 14:15  01/21/18 21:54





  Normal Saline  IV   250 mls/hr





  ASDIRECTED HEIDY   Administration


 


Levofloxacin/Dextrose 750 mg/  150 mls @ 100 mls/hr  01/21/18 16:30  01/21/18 16

:38





  Premix  IV  01/21/18 17:59  100 mls/hr





  ONETIME ONE   Administration


 


Levofloxacin/Dextrose 750 mg/  150 mls @ 100 mls/hr  01/23/18 16:30  





  Premix  IV   





  Q48H HEIDY   


 


Sodium Chloride  1,000 mls @ 100 mls/hr  01/22/18 00:30  01/21/18 23:20





  Normal Saline  IV   100 mls/hr





  ASDIRECTED HEIDY   Administration


 


Levofloxacin/Dextrose 750 mg/  150 mls @ 100 mls/hr  01/22/18 12:00  





  Premix  IV  01/22/18 13:29  





  Q48H HEIDY   


 


Latanoprost  0 ml  01/22/18 21:00  





  Xalatan 0.005% Ophth Soln  EYEBOTH   





  BEDTIME HEIDY   


 


Levofloxacin  250 mg  01/23/18 10:00  





  Levaquin  PO   





  Q24H HEIDY   


 


Levothyroxine Sodium  150 mcg  01/22/18 06:00  01/22/18 05:41





  Levothyroxine  PO   150 mcg





  ACBREAKFAST HEIDY   Administration


 


Lorazepam  1 mg  01/21/18 14:09  01/21/18 14:21





  Ativan  IVPUSH  01/21/18 14:10  0.5 mg





  ONETIME ONE   Administration


 


Lorazepam  1 mg  01/21/18 20:37  01/22/18 09:24





  Ativan  IVPUSH   1 mg





  Q8H PRN   Administration





  Anxiety   


 


Metoprolol Tartrate  25 mg  01/21/18 21:00  01/22/18 09:28





  Lopressor  PO   25 mg





  BID HEIDY   Administration


 


Mineral Oil/White Petrolatum  1 gm  01/22/18 11:56  





  Minerin Creme  TOP   





  BID PRN   





  Dryness   


 


Olanzapine  15 mg  01/22/18 09:00  





  Zyprexa Zydis  PO   





  DAILY [facility]   


 


Olanzapine  15 mg  01/22/18 21:00  





  Zyprexa  PO   





  BEDTIME HEIDY   


 


Memantine Hcl 7 Mg  0 each  01/22/18 09:00  01/22/18 09:30





  Xr (Namenda)  PO   Not Given





  DAILY [facility]   


 


Bisacodyl 30 Ml  0 each  01/22/18 11:56  





  RECTAL   





  Q3D PRN   





  Constipation   


 


Brinomidine 0.1%  0 each  01/22/18 21:00  





  Ophth. Drops  EYEBOTH   





  BID HEIDY   


 


Dextran 70%/  0 each  01/22/18 13:00  





Hypromellose Ophtht  EYEBOTH   





Drops  Q4HR HEIDY   


 


Polyethylene Glycol  17 gm  01/22/18 09:00  





  Miralax  PO   





  DAILY HEIDY   


 


Polyethylene Glycol  17 gm  01/22/18 09:00  01/22/18 09:34





  Miralax  PO   17 gm





  Q2D HEIDY   Administration


 


Senna  8.6 mg  01/22/18 11:56  





  Senna  PO   





  DAILY PRN   





  Constipation   


 


Simvastatin  5 mg  01/21/18 21:00  01/21/18 21:52





  Zocor  PO   5 mg





  BEDTIME HEIDY   Administration


 


Timolol Maleate  0 ml  01/22/18 21:00  





  Timoptic 0.5% Ophth Soln  EYELF   





  BID HEIDY   














- Radiology Interpretation


Free Text/Narrative:: 





82-year-old female brought to the ED from [place] by ambulance for 

evaluation after she fell onto both knees. It's unclear whether this was 

witnessed by staff. She has a bruise over her left patella superiorly oriented 

i.e. distal femur. Bruising over the left before meals joint. No outward signs 

of head or neck trauma or rib or thoracic lumbar spine trauma. She does feel 

warm to palpation one wonders if she is running a fever. Therefore septic 

workup will be done including a lactic acid level. She is quite agitated and 

was not very cooperative with nurses particularly trying to obtain a rectal 

temperature. Plan x-ray of the left shoulder and left knee to be done. Routine 

labs to include lactic acid level and urinalysis likely by catheterization. 

Will give Ativan 1 mg IV to provide some degree of sedation so that she may be 

able to comply with investigations.





- Re-Assessments/Exams


Free Text/Narrative Re-Assessment/Exam: 





01/21/18 15:11  Labs reveal a normal white count at 8.39 with 71% neutrophils 

and no bands. Hemoglobin is 12.2 with a hematocrit of 36.2. Platelet count is 

191,000. PT is 11.2 with an INR 1.03. Sodium is 145 with a potassium of 3.9. 

Chloride is 109 bicarbonate is 24. And a gap is 15.9 slightly elevated BUN is 

24 slightly elevated. Liver function is normal magnesium normal at 1.9. Glucose 

is 86. CK-MB fraction is 2.4 with a troponin I of his left-sided 0.017. C-

reactive protein is less than 0.2 BNP is 324. Lactic acid is pending.


01/21/18 . X-rays of the left knee revealed bone-on-bone with the 

patellofemoral junction. 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 

lateral and medial compartments of the knee. There is also spurring from the 

posterior intercondylar process. No fractures are identified however. Left 

shoulder shows a large gap between the acromial humeral head compatible with 

significant rotator cuff disorder disease. No fractures are identified in the 

shoulder or acromion process. The chest x-ray suggests an early infiltrate in 

the right upper lobe of the right lung suggestive of an early pneumonitis.  

There is also some congestion of the right hilar areas suggestive of vascular 

congestion. Influenza screen was negative as well.





01/21/18 16:28  Sedimentation rate was 36. Lactic acid was 1.3. Urinalysis 

reveals positive nitrates with 10-20 pus cells per hpf. Urine culture ordered. 

She will be started on Levaquin 750 mg IV.





01/21/18 17:20 Case discussed with on-call hospitalist -- Dr. Carcamo and she 

will admit the patient to [facility] surgery on telemetry.











Departure





- Departure


Time of Disposition: 20:20


Disposition: Admitted As Inpatient 68


Condition: Poor


Clinical Impression: 


 Acute febrile illness





Urinary tract infection


Qualifiers:


 Urinary tract infection type: site unspecified Hematuria presence: without 

hematuria Qualified Code(s): N39.0 - Urinary tract infection, site not specified





Fall as cause of accidental injury in residential institution as place of 

occurrence


Qualifiers:


 Encounter type: initial encounter Qualified Code(s): W19.XXXA - Unspecified 

fall, initial encounter





Contusion of left knee


Qualifiers:


 Encounter type: initial encounter Qualified Code(s): S80.02XA - Contusion of 

left knee, initial encounter





Contusion of left shoulder


Qualifiers:


 Encounter type: initial encounter Qualified Code(s): S40.012A - Contusion of 

left shoulder, initial encounter








- Discharge Information

## 2018-01-21 NOTE — PCM.HP
H&P History of Present Illness





- General


Date of Service: 01/21/18


Source of Information: Provider


History Limitations: Reports: No Limitations





- History of Present Illness


Initial Comments - Free Text/Narative: 











82 year old female fell without LOC, however did bruise left knee and shoulder.

  The patient reports weakness and not feeling well.  She admits to fever/

chills and malaise. She displayed difficulty walking and was somewhat agitated 

during her ED assessment.  The patient is from [place] with baseline 

dementia.  She will be admitted to [facility]. 


Onset of Symptoms: Reports: Unknown/Unsure


Duration of Symptoms: Reports: Day(s):, Getting Worse


Location: Reports: Chest, Generalized


Severity: Moderate


Improves with: Reports: Medication


Worsens with: Reports: None


Context: Reports: Sick Contact


Associated Symptoms: Reports: Cough, Fever/Chills, Nausea/Vomiting, Weakness





- Related Data


Allergies/Adverse Reactions: 


 Allergies











Allergy/AdvReac Type Severity Reaction Status Date / Time


 


atorvastatin Allergy  Other Verified 01/21/18 13:53


 


codeine Allergy  Other Verified 01/21/18 13:53


 


losartan Allergy  Other Verified 01/21/18 13:53


 


morphine Allergy  Other Verified 01/21/18 13:53


 


Penicillins Allergy  Other Verified 01/21/18 13:53











Home Medications: 


 Home Meds





Acetaminophen [Tylenol] 650 mg PO Q4H PRN 01/21/18 [History]


Aspirin 81 mg PO DAILY 01/21/18 [History]


Bisacodyl [Dulcolax] 10 mg RECTAL ASDIRECTED PRN 01/21/18 [History]


Calcium Carbonate [Tums] 200 mg PO DAILY PRN 01/21/18 [History]


Dextromethorphan/guaiFENesin [Robitussin DM] 10 ml PO QID PRN 01/21/18 [History]


Furosemide [Lasix] 20 mg PO DAILY 01/21/18 [History]


Levothyroxine 150 mcg PO DAILY 01/21/18 [History]


Memantine HCl [Namenda Xr] 7 mg PO DAILY 01/21/18 [History]


Metoprolol Tartrate 50 mg PO BID 01/21/18 [History]


OLANZapine [ZyPREXA Zydis] 15 mg PO BEDTIME 01/21/18 [History]


Polyethylene Glycol 3350 [MiraLAX] 17 gm PO Q2D 01/21/18 [History]


Sennosides [Senna] 1 tab PO DAILY PRN 01/21/18 [History]


Simvastatin [Zocor] 5 mg PO BEDTIME 01/21/18 [History]


Betamethasone/Clotrimazole [Lotrisone] 1 applic TOP BID PRN 01/22/18 [History]


Bisacodyl 30 ml RECTAL Q3D PRN 01/22/18 [History]


Brimonidine Tartrate [Alphagan P 0.1% Ophth Soln] 1 drop EYEBOTH BID 01/22/18 [

History]


[place]/Min Oil/Desire/Wool Alcoh [Eucerin Creme] 1 applic TOP BID PRN 01/22/18 [

History]


Dextran 70/Hypromellose [Artificial Tears] 1 drop EYEBOTH Q4HR 01/22/18 [History

]


Dorzolamide [Trusopt 2% Ophth Soln] 1 drop EYELF BID 01/22/18 [History]


Dorzolamide [Trusopt 2% Ophth Soln] 10 ml EYELF BID 01/22/18 [History]


Latanoprost [Xalatan 0.005% Ophth Soln] 1 drop EYEBOTH BEDTIME 01/22/18 [History

]


Levofloxacin [Levaquin] 250 mg PO Q24H #5 tablet 01/22/18 [Rx]


Polyethylene Glycol 3350 [MiraLAX] 17 gm PO Q2D  packet 01/22/18 [Rx]


Timolol Maleate [Timoptic 0.5% Ophth Soln] 1 drop EYELF BID 01/22/18 [History]











Past Medical History


HEENT History: Reports: Glaucoma, Impaired Vision, Other (See Below)


Other HEENT History: dry eyes syndrome, wears eyeglasses.


Cardiovascular History: Reports: High Cholesterol, Hypertension


Gastrointestinal History: Reports: Chronic Constipation, GERD


Genitourinary History: Reports: Renal Disease, Other (See Below)


Other Genitourinary History: hx hyperglycemic--hyperosmolar coma ([facility])


OB/GYN History: Reports: Pregnancy


Musculoskeletal History: Reports: Gout


Other Musculoskeletal History: uses walker


Neurological History: Reports: Alzheimers Disease, Other (See Below)


Other Neuro History: encephalopathy, AMS


Psychiatric History: Reports: Alzheimers Disease, Dementia, Schizophrenia, 

Other (See Below)


Other Psychiatric History: delirium


Endocrine/Metabolic History: Reports: Diabetes, Type II, Hypothyroidism, Obesity

/BMI 30+





- Infectious Disease History


Infectious Disease History: Reports: Other (See Below)


Other Infectious Disease History: none per record. MRSA swab sent on this stay





- Past Surgical History


Other Endocrine Surgeries/Procedures: thyroid surgery


Neurological Surgical History: Reports: None





Social & Family History





- Family History


Family Medical History: Noncontributory





- Tobacco Use


Smoking Status *Q: Former Smoker


Used Tobacco, but Quit: Yes


Month Tobacco Last Used: 40 yrs


Second Hand Smoke Exposure: No





- Caffeine Use


Caffeine Use: Reports: Coffee





- Recreational Drug Use


Recreational Drug Use: No





- Living Situation & Occupation


Living situation: Reports: Extended Care Facility (Currently living in the 

[facility])


Occupation: Retired





H&P Review of Systems





- Review of Systems:


Review Of Systems: See Below


General: Reports: Fever, Chills, Malaise, Weakness


HEENT: Reports: No Symptoms


Pulmonary: Reports: No Symptoms


Cardiovascular: Reports: No Symptoms


Gastrointestinal: Reports: No Symptoms


Genitourinary: Reports: No Symptoms


Musculoskeletal: Reports: No Symptoms


Skin: Reports: No Symptoms


Psychiatric: Reports: No Symptoms


Neurological: Reports: No Symptoms





Exam





- Exam


Exam: See Below





- Vital Signs


Vital Signs: 


 Last Vital Signs











Temp  37.4 C   01/21/18 13:53


 


Pulse  95   01/21/18 13:53


 


Resp  15   01/21/18 13:53


 


BP  115/75   01/21/18 13:53


 


Pulse Ox  96   01/21/18 13:53











Weight: 81.647 kg





- Exam


Quality Assessment: DVT Prophylaxis


General: Alert, Oriented (self)


HEENT: Conjunctiva Clear, Nares Patent, Normal Nasal Septum, Pupils Equal, 

Pupils Reactive, PERRLA


Neck: Supple, Trachea Midline


Lungs: Normal Respiratory Effort


Cardiovascular: Regular Rate, Regular Rhythm


GI/Abdominal Exam: Normal Bowel Sounds, Soft, Non-Tender, No Organomegaly, No 

Distention


 (Female) Exam: Deferred


Rectal (Female) Exam: Deferred


Back Exam: Normal Inspection


Extremities: Normal Inspection


Skin: Warm


Neurological: Cranial Nerves Intact


Neuro Extensive - Mental Status: Alert





- Patient Data


Result Diagrams: 


 01/22/18 07:30





 01/22/18 07:30





*Q Meaningful Use (ADM)





- VTE *Q


VTE Criteria *Q: 








- Stroke *Q


Stroke Criteria *Q: 








- AMI *Q


AMI Criteria *Q: 








- Problem List


(1) Acute febrile illness


SNOMED Code(s): 917250768


   ICD Code: R50.9 - FEVER, UNSPECIFIED   Status: Acute   Current Visit: Yes   





(2) Contusion of left knee


SNOMED Code(s): 81830686


   ICD Code: S80.02XA - CONTUSION OF LEFT KNEE, INITIAL ENCOUNTER   Status: 

Acute   Current Visit: Yes   


Qualifiers: 


   Encounter type: initial encounter   Qualified Code(s): S80.02XA - Contusion 

of left knee, initial encounter   





(3) Contusion of left shoulder


SNOMED Code(s): 06827517


   ICD Code: S40.012A - CONTUSION OF LEFT SHOULDER, INITIAL ENCOUNTER   Status: 

Acute   Current Visit: Yes   


Qualifiers: 


   Encounter type: initial encounter   Qualified Code(s): S40.012A - Contusion 

of left shoulder, initial encounter   





(4) Urinary tract infection


SNOMED Code(s): 29443244


   ICD Code: N39.0 - URINARY TRACT INFECTION, SITE NOT SPECIFIED   Status: 

Acute   Current Visit: Yes   


Qualifiers: 


   Urinary tract infection type: site unspecified   Hematuria presence: without 

hematuria   Qualified Code(s): N39.0 - Urinary tract infection, site not 

specified   


Problem List Initiated/Reviewed/Updated: Yes


Orders Last 24hrs: 


 Medication Orders





Sodium Chloride (Normal Saline)  1,000 mls @ 250 mls/hr IV ASDIRECTED [facility]


   Last Admin: 01/21/18 14:21  Dose: 250 mls/hr


Levofloxacin/Dextrose 750 mg/ (Premix)  150 mls @ 100 mls/hr IV ONETIME ONE


   Stop: 01/21/18 17:59


   Last Admin: 01/21/18 16:38  Dose: 100 mls/hr








Assessment/Plan Comment:: 











Impression:





S/P fall without LOC





Febrile illness unlikely pulmonary source





AUTI





AMS with baseline dementia


























Plan:





IVF


Clear-->Full liquids after swallow eval


Home meds


Daily Labs


Continue Levoquin


DVT prophylaxis


GI prophylaxis

## 2018-01-22 VITALS — SYSTOLIC BLOOD PRESSURE: 139 MMHG | DIASTOLIC BLOOD PRESSURE: 77 MMHG

## 2018-01-22 RX ADMIN — LORAZEPAM PRN MG: 2 INJECTION INTRAMUSCULAR; INTRAVENOUS at 09:24

## 2018-01-22 NOTE — PCM.DCSUM1
**Discharge Summary





- Hospital Course


Free Text/Narrative:: 











82 year old female from [facility] dementia presented with AMS, improved 

after IVF and Levoquin.  The ED was concerned about a possible respiratory 

culprit;  the patient was admitted after receiving Levoquin in the ED as as 

well IVF.  She was negative for influenza;  she however did have a UTI.  Renal 

dose Levoquin was povided;  she was actively walking on the [facility] floor after 

requesting a dinner on admission.  She rapidly returned to baseline on admission

, and will be given Levoquin 250 mg daily for 5 days at [facility].











Primary Dx


AMS with no LOC after falling


Alzheimer dementia


Dehydration


Acute on chronic renal failure


UTI











Diet


Heart healthy diet











Medication


New:  Levoquin 250 mg daily.











Activity


As tolerated











Follow up with PCP 1-2 weeks
































- Discharge Data


Discharge Date: 01/22/18


Discharge Disposition: [facility] to [facility]


Condition: Good





- Discharge Diagnosis/Problem(s)


(1) Acute febrile illness


SNOMED Code(s): 423714037


   ICD Code: R50.9 - FEVER, UNSPECIFIED   Status: Acute   Current Visit: Yes   





(2) Contusion of left knee


SNOMED Code(s): 29206416


   ICD Code: S80.02XA - CONTUSION OF LEFT KNEE, INITIAL ENCOUNTER   Status: 

Acute   Current Visit: Yes   


Qualifiers: 


   Encounter type: initial encounter   Qualified Code(s): S80.02XA - Contusion 

of left knee, initial encounter   





(3) Contusion of left shoulder


SNOMED Code(s): 83502426


   ICD Code: S40.012A - CONTUSION OF LEFT SHOULDER, INITIAL ENCOUNTER   Status: 

Acute   Current Visit: Yes   


Qualifiers: 


   Encounter type: initial encounter   Qualified Code(s): S40.012A - Contusion 

of left shoulder, initial encounter   





(4) Urinary tract infection


SNOMED Code(s): 42244701


   ICD Code: N39.0 - URINARY TRACT INFECTION, SITE NOT SPECIFIED   Status: 

Acute   Current Visit: Yes   


Qualifiers: 


   Urinary tract infection type: site unspecified   Hematuria presence: without 

hematuria   Qualified Code(s): N39.0 - Urinary tract infection, site not 

specified   





- Discharge Plan


Prescriptions/Med Rec: 


Levofloxacin [Levaquin] 250 mg PO Q24H #5 tablet


Home Medications: 


 Home Meds





Acetaminophen [Tylenol] 650 mg PO Q4H PRN 01/21/18 [History]


Aspirin 81 mg PO DAILY 01/21/18 [History]


Bisacodyl [Dulcolax] 10 mg RECTAL ASDIRECTED PRN 01/21/18 [History]


Calcium Carbonate [Tums] 200 mg PO DAILY PRN 01/21/18 [History]


Dextromethorphan/guaiFENesin [Robitussin DM] 10 ml PO QID PRN 01/21/18 [History]


Furosemide [Lasix] 20 mg PO DAILY 01/21/18 [History]


Levothyroxine 150 mcg PO DAILY 01/21/18 [History]


Memantine HCl [Namenda Xr] 7 mg PO DAILY 01/21/18 [History]


Metoprolol Tartrate 50 mg PO BID 01/21/18 [History]


OLANZapine [ZyPREXA Zydis] 15 mg PO BEDTIME 01/21/18 [History]


Polyethylene Glycol 3350 [MiraLAX] 17 gm PO Q2D 01/21/18 [History]


Sennosides [Senna] 1 tab PO DAILY PRN 01/21/18 [History]


Simvastatin [Zocor] 5 mg PO BEDTIME 01/21/18 [History]


Betamethasone/Clotrimazole [Lotrisone] 1 applic TOP BID PRN 01/22/18 [History]


Bisacodyl 30 ml RECTAL Q3D PRN 01/22/18 [History]


Brimonidine Tartrate [Alphagan P 0.1% Ophth Soln] 1 drop EYEBOTH BID 01/22/18 [

History]


[place]/Min Oil/Desire/Wool Alcoh [Eucerin Creme] 1 applic TOP BID PRN 01/22/18 [

History]


Dextran 70/Hypromellose [Artificial Tears] 1 drop EYEBOTH Q4HR 01/22/18 [History

]


Dorzolamide [Trusopt 2% Ophth Soln] 1 drop EYELF BID 01/22/18 [History]


Dorzolamide [Trusopt 2% Ophth Soln] 10 ml EYELF BID 01/22/18 [History]


Latanoprost [Xalatan 0.005% Ophth Soln] 1 drop EYEBOTH BEDTIME 01/22/18 [History

]


Levofloxacin [Levaquin] 250 mg PO Q24H #5 tablet 01/22/18 [Rx]


Polyethylene Glycol 3350 [MiraLAX] 17 gm PO Q2D  packet 01/22/18 [Rx]


Timolol Maleate [Timoptic 0.5% Ophth Soln] 1 drop EYELF BID 01/22/18 [History]








Forms:  ED Department Discharge


Referrals: 


Aditya Yao MD [Primary Care Provider] -  (Please follow-up in 1 week with 

.)





- General Info


Date of Service: 01/21/18


Functional Status: Reports: Tolerating Diet, Ambulating, Urinating





- Review of Systems


General: Reports: No Symptoms


HEENT: Reports: No Symptoms


Pulmonary: Reports: No Symptoms


Cardiovascular: Reports: No Symptoms


Gastrointestinal: Reports: No Symptoms


Genitourinary: Reports: No Symptoms


Musculoskeletal: Reports: No Symptoms


Skin: Reports: No Symptoms


Neurological: Reports: No Symptoms


Psychiatric: Reports: No Symptoms





- Patient Data


Vitals - Most Recent: 


 Last Vital Signs











Temp  36.9 C   01/22/18 01:50


 


Pulse  73   01/22/18 09:28


 


Resp  18   01/22/18 01:50


 


BP  99/78   01/22/18 09:28


 


Pulse Ox  100   01/22/18 01:50











Weight - Most Recent: 81.737 kg


I&O - Last 24 hours: 


 Intake & Output











 01/21/18 01/22/18 01/22/18





 22:59 06:59 14:59


 


Intake Total  1232 


 


Balance  1232 











Lab Results - Last 24 hrs: 


 Laboratory Results - last 24 hr











  01/22/18 01/22/18 01/22/18 Range/Units





  04:30 07:30 07:30 


 


WBC   9.36   (3.98-10.04)  K/mm3


 


RBC   4.06   (3.98-5.22)  M/mm3


 


Hgb   12.9   (11.2-15.7)  gm/L


 


Hct   37.8   (34.1-44.9)  %


 


MCV   93.1   (79.4-94.8)  fl


 


MCH   31.8   (25.6-32.2)  pg


 


MCHC   34.1   (32.2-35.5)  g/dl


 


RDW Std Deviation   45.4   (36.4-46.3)  fL


 


Plt Count   212   (182-369)  K/mm3


 


MPV   11.6   (9.4-12.3)  fl


 


Neut % (Auto)   66.3   (34.0-71.1)  %


 


Lymph % (Auto)   22.5   (19.3-51.7)  %


 


Mono % (Auto)   7.6   (4.7-12.5)  %


 


Eos % (Auto)   3.2   (0.7-5.8)  


 


Baso % (Auto)   0.2   (0.1-1.2)  %


 


Neut # (Auto)   6.20 H   (1.56-6.13)  K/mm3


 


Lymph # (Auto)   2.11   (1.18-3.74)  K/mm3


 


Mono # (Auto)   0.71 H   (0.24-0.36)  K/mm3


 


Eos # (Auto)   0.30   (0.04-0.36)  K/mm3


 


Baso # (Auto)   0.02   (0.01-0.08)  K/mm3


 


Sodium    143  (136-145)  mEq/L


 


Potassium    3.9  (3.5-5.1)  mEq/L


 


Chloride    108 H  ()  mEq/L


 


Carbon Dioxide    25  (21-32)  mEq/L


 


Anion Gap    13.9  (5-15)  


 


BUN    17  (7-18)  mg/dL


 


Creatinine    1.1 H  (0.55-1.02)  mg/dL


 


Est Cr Clr Drug Dosing    33.74  mL/min


 


Estimated GFR (MDRD)    48  (>60)  mL/min


 


BUN/Creatinine Ratio    15.5  (14-18)  


 


Glucose    104  ()  mg/dL


 


Lactic Acid     (0.4-2.0)  mmol/L


 


Calcium    9.4  (8.5-10.1)  mg/dL


 


Troponin I    < 0.017  (0.00-0.056)  ng/mL


 


C-Reactive Protein    < 0.2  (<1.0)  mg/dL


 


Mycoplasma pneumon IgM    Negative  (NEGATIVE)  


 


MRSA (PCR)  Negative    














  01/22/18 Range/Units





  07:30 


 


WBC   (3.98-10.04)  K/mm3


 


RBC   (3.98-5.22)  M/mm3


 


Hgb   (11.2-15.7)  gm/L


 


Hct   (34.1-44.9)  %


 


MCV   (79.4-94.8)  fl


 


MCH   (25.6-32.2)  pg


 


MCHC   (32.2-35.5)  g/dl


 


RDW Std Deviation   (36.4-46.3)  fL


 


Plt Count   (182-369)  K/mm3


 


MPV   (9.4-12.3)  fl


 


Neut % (Auto)   (34.0-71.1)  %


 


Lymph % (Auto)   (19.3-51.7)  %


 


Mono % (Auto)   (4.7-12.5)  %


 


Eos % (Auto)   (0.7-5.8)  


 


Baso % (Auto)   (0.1-1.2)  %


 


Neut # (Auto)   (1.56-6.13)  K/mm3


 


Lymph # (Auto)   (1.18-3.74)  K/mm3


 


Mono # (Auto)   (0.24-0.36)  K/mm3


 


Eos # (Auto)   (0.04-0.36)  K/mm3


 


Baso # (Auto)   (0.01-0.08)  K/mm3


 


Sodium   (136-145)  mEq/L


 


Potassium   (3.5-5.1)  mEq/L


 


Chloride   ()  mEq/L


 


Carbon Dioxide   (21-32)  mEq/L


 


Anion Gap   (5-15)  


 


BUN   (7-18)  mg/dL


 


Creatinine   (0.55-1.02)  mg/dL


 


Est Cr Clr Drug Dosing   mL/min


 


Estimated GFR (MDRD)   (>60)  mL/min


 


BUN/Creatinine Ratio   (14-18)  


 


Glucose   ()  mg/dL


 


Lactic Acid  0.8  (0.4-2.0)  mmol/L


 


Calcium   (8.5-10.1)  mg/dL


 


Troponin I   (0.00-0.056)  ng/mL


 


C-Reactive Protein   (<1.0)  mg/dL


 


Mycoplasma pneumon IgM   (NEGATIVE)  


 


MRSA (PCR)   











Med Orders - Current: 


 Current Medications





Acetaminophen (Tylenol)  650 mg PO Q6H PRN


   PRN Reason: Pain


   Last Admin: 01/21/18 21:48 Dose:  650 mg


Aspirin (Aspirin)  81 mg PO DAILY [facility]


   Last Admin: 01/22/18 09:28 Dose:  81 mg


Levofloxacin/Dextrose 750 mg/ (Premix)  150 mls @ 100 mls/hr IV Q48H [facility]


   Stop: 01/23/18 12:59


Levothyroxine Sodium (Levothyroxine)  150 mcg PO ACBREAKFAST [facility]


   Last Admin: 01/22/18 05:41 Dose:  150 mcg


Lorazepam (Ativan)  1 mg IVPUSH Q8H PRN


   PRN Reason: Anxiety


   Last Admin: 01/22/18 09:24 Dose:  1 mg


Metoprolol Tartrate (Lopressor)  25 mg PO BID [facility]


   Last Admin: 01/22/18 09:28 Dose:  25 mg


Olanzapine (Zyprexa)  15 mg PO BEDTIME [facility]


Memantine Hcl 7 Mg (Xr (Namenda))  0 each PO DAILY [facility]


   Last Admin: 01/22/18 09:30 Dose:  Not Given


Polyethylene Glycol (Miralax)  17 gm PO Q2D [facility]


   Last Admin: 01/22/18 09:34 Dose:  17 gm


Simvastatin (Zocor)  5 mg PO BEDTIME [facility]


   Last Admin: 01/21/18 21:52 Dose:  5 mg





Discontinued Medications





Sodium Chloride (Normal Saline)  1,000 mls @ 100 mls/hr IV ASDIRECTED [facility]


   Last Admin: 01/21/18 21:54 Dose:  250 mls/hr


Levofloxacin/Dextrose 750 mg/ (Premix)  150 mls @ 100 mls/hr IV ONETIME ONE


   Stop: 01/21/18 17:59


   Last Admin: 01/21/18 16:38 Dose:  100 mls/hr


Levofloxacin/Dextrose 750 mg/ (Premix)  150 mls @ 100 mls/hr IV Q48H [facility]


Sodium Chloride (Normal Saline)  1,000 mls @ 100 mls/hr IV ASDIRECTED [facility]


   Last Admin: 01/21/18 23:20 Dose:  100 mls/hr


Lorazepam (Ativan)  1 mg IVPUSH ONETIME ONE


   Stop: 01/21/18 14:10


   Last Admin: 01/21/18 14:21 Dose:  0.5 mg


Olanzapine (Zyprexa Zydis)  15 mg PO DAILY [facility]


Polyethylene Glycol (Miralax)  17 gm PO DAILY [facility]











- Exam


Quality Assessment: Reports: DVT Prophylaxis


General: Reports: Alert, Oriented, No Acute Distress


HEENT: Reports: Pupils Equal, Pupils Reactive, EOMI


Neck: Reports: Trachea Midline, No JVD


Lungs: Reports: Normal Respiratory Effort


Cardiovascular: Reports: Regular Rate, Regular Rhythm


GI/Abdominal Exam: Normal Bowel Sounds, Soft, Non-Tender, No Organomegaly, No 

Distention


 (Female) Exam: Deferred


Rectal (Female) Exam: Deferred


Back Exam: Reports: Normal Inspection


Extremities: Normal Inspection


Skin: Reports: Warm


Neurological: Reports: No New Focal Deficit


Psy/Mental Status: Reports: Alert





*Q Meaningful Use (DIS)





- VTE *Q


VTE Criteria *Q: 








- Stroke *Q


Stroke Criteria *Q: 








- AMI *Q


AMI Criteria *Q:

## 2018-01-22 NOTE — CT
Head CT

 

Technique: Multiple axial sections through the brain were obtained.  

Intravenous contrast was not utilized.

 

Comparison: Prior head CT study of 11/21/17.

 

Findings: Ventricles along with basal cisterns and sulci over the 

convexities are mildly prominent.  Mild diminished density is noted 

within the periventricular white matter compatible with small vessel 

ischemic demyelination change.  No other abnormal parenchymal 

densities are seen.  No evidence of intracranial hemorrhage.  No 

midline shift or mass effect is seen.  Slight atherosclerotic 

calcification is noted within the vertebral vessels and within the 

carotid siphon.  Bone window settings were reviewed which show the 

visualized sinuses to appear clear.  No acute calvarial abnormality is

 seen.

 

Impression:

1.  Mild senescent change.  No acute intracranial abnormality is 

identified.  No significant change is seen from prior head CT exam.

 

Diagnostic code #2

## 2018-01-22 NOTE — CR
Left shoulder:  Three views of the left shoulder were obtained.

 

Comparison: No prior shoulder study.

 

Glenohumeral joint and acromioclavicular joint appear within normal 

limits.  Small cyst is noted at the base of the greater tuberosity 

which is incidental.  Osteopenia is present.  Nothing acute is seen.

 

Impression:

1.  Incidental findings.  Nothing acute is identified.

 

Diagnostic code #2

## 2018-01-22 NOTE — CR
Chest: Portable view of the chest was obtained.

 

Comparison: Prior chest x-ray of 11/21/17.

 

Heart size is normal.  Tortuous thoracic aorta is seen.  Slight 

scarring is seen within the left mid to lower lung.  Central lung 

markings are mildly increased which appear to be chronic.  No acute 

pulmonary densities are seen.  Slight scoliosis is present within the 

spine.

 

Impression:

1.  Incidental findings.  Nothing acute is suspected on portable chest

 x-ray.

 

Diagnostic code #2

## 2018-01-22 NOTE — CR
Left knee: AP and lateral views of the left knee were obtained.

 

Comparison: No previous knee study.

 

Severe medial joint space narrowing is seen.  Medial osteophytes are 

also noted.  Lateral joint space is preserved.  Diffuse joint space 

narrowing is noted within the patellofemoral joint.  No acute fracture

 or other abnormality is seen.

 

Impression:

1.  Degenerative change as noted above.

2.  Nothing acute is appreciated on two-view left knee exam.

 

Diagnostic code #2

## 2018-10-12 NOTE — [email or web address]
**Discharge Summary





- Discharge Data


Discharge Date: 11/24/17 (Admit Date: 11/21/17)


Discharge Disposition: DC/Tfer to SNF 03


Condition: Good





- Discharge Diagnosis/Problem(s)


(1) TIA (transient ischemic attack)


SNOMED Code(s): 335611797


   ICD Code: G45.9 - TRANSIENT CEREBRAL ISCHEMIC ATTACK, UNSPECIFIED   Status: 

Acute   Priority: High   Current Visit: Yes   


Qualifiers: 


   Transient cerebral ischemia type: unspecified   Qualified Code(s): G45.9 - 

Transient cerebral ischemic attack, unspecified   





(2) Hypomagnesemia


SNOMED Code(s): 957383798


   ICD Code: E83.42 - HYPOMAGNESEMIA   Status: Resolved   Priority: High   

Current Visit: Yes   





(3) Failure to thrive


SNOMED Code(s): 97221050


   ICD Code: TOG3126 -    Status: Acute   Priority: High   Current Visit: Yes   


Qualifiers: 


   Failure to thrive age range: in adult   Qualified Code(s): R62.7 - Adult 

failure to thrive   





(4) Alzheimer disease


SNOMED Code(s): 94494198


   ICD Code: G30.9 - ALZHEIMER'S DISEASE, UNSPECIFIED   Status: Chronic   

Priority: High   Current Visit: Yes   


Qualifiers: 


   Alzheimer's disease onset: late-onset   Dementia behavioral disturbance: 

with behavioral disturbance   Qualified Code(s): G30.1 - Alzheimer's disease 

with late onset; F02.81 - Dementia in other diseases classified elsewhere with 

behavioral disturbance   





(5) Confusion


SNOMED Code(s): 086716267


   ICD Code: R41.0 - DISORIENTATION, UNSPECIFIED   Status: Chronic   Priority: 

High   Current Visit: Yes   





(6) Hypothyroidism


SNOMED Code(s): 06244628


   ICD Code: E03.9 - HYPOTHYROIDISM, UNSPECIFIED   Status: Chronic   Priority: 

Low   Current Visit: No   


Qualifiers: 


   Hypothyroidism type: unspecified   Qualified Code(s): E03.9 - Hypothyroidism

, unspecified   





(7) Lung nodule < 6cm on CT


SNOMED Code(s): 058848529


   ICD Code: R91.1 - SOLITARY PULMONARY NODULE   Status: Chronic   Priority: 

Low   Current Visit: Yes   





(8) Type II diabetes mellitus


SNOMED Code(s): 94422253


   ICD Code: E11.9 - TYPE 2 DIABETES MELLITUS WITHOUT COMPLICATIONS   Status: 

Chronic   Priority: Low   Current Visit: Yes   


Qualifiers: 


   Diabetes mellitus complication status: with unspecified complications   

Diabetes mellitus long term insulin use: without long term use   Qualified Code(

s): E11.8 - Type 2 diabetes mellitus with unspecified complications   





- Patient Summary/Data


Consults: 


 Consultations





11/21/17 20:10


Consult to Case Management [CONS] Routine 


Consult to  [CONS] Routine 


OT Evaluation and Treatment [CONS] Routine 


PT Evaluation and Treatment [CONS] Routine 


SLP Evaluation and Treatment [CONS] Routine 














- Patient Instructions


Diet: Heart Healthy Diet, Diabetic Diet


Activity: As Tolerated


Driving: Do Not Drive


Showering/Bathing: May Shower


Notify Provider of: Fever, Increased Pain, Nausea and/or Vomiting (Stroke signs/

symptoms. )





- Discharge Plan


Home Medications: 


 Home Meds





Calcium Carbonate [Tums] 200 mg PO DAILY PRN 07/30/17 [History]


Levothyroxine 150 mcg PO ACBREAKFAST 07/30/17 [History]


Sennosides [Senna] 8.6 mg PO DAILY PRN 07/30/17 [History]


Bisacodyl [Dulcolax] 10 mg RECTAL ASDIRECTED PRN 08/29/17 [History]


Brimonidine Tartrate [Alphagan P 0.1% Ophth Soln] 1 drop EYEBOTH BID 08/29/17 [

History]


Milam/Min Oil/Desire/Wool Alcoh [Eucerin Creme] 1 applic EARBOTH BID PRN 08/29/ 17 [History]


Colesevelam [Welchol] 3 tab PO BID 08/29/17 [History]


Dextran 70/Hypromellose [Artificial Tears] 1 drop EYEBOTH QID 08/29/17 [History]


Dorzolamide [Trusopt 2% Ophth Soln] 1 drop EYELF BID 08/29/17 [History]


Latanoprost [Xalatan 0.005% Ophth Soln] 1 drop EYEBOTH BEDTIME 08/29/17 [History

]


Metoprolol Tartrate [Lopressor] 50 mg PO BID 08/29/17 [History]


OLANZapine [Olanzapine] 15 mg PO BEDTIME 08/29/17 [History]


guaiFENesin/Dextromethorphan [Safetussin DM] 10 ml PO Q4H PRN 08/29/17 [History]


metFORMIN [Glucophage] 500 mg PO BID 08/29/17 [History]


Betamethasone/Clotrimazole [Lotrisone] 1 appful TOP BID PRN 09/08/17 [History]


Memantine HCl [Namenda XR] 7 mg PO DAILY 09/08/17 [History]


Acetaminophen [Tylenol] 650 mg PO Q6H PRN 11/11/17 [History]


Na Phos,M-B/Na Phos,Di-Ba [Fleet Enema] 30 ml RECTAL Q3D PRN 11/11/17 [History]


Polyethylene Glycol 3350 [Miralax] 17 gr PO DAILY 11/11/17 [History]


Timolol Maleate/PF [Timoptic 0.5% Ocudose Drop] 1 drop EYELF BID 11/23/17 [

History]








Patient Handouts:  Transient Ischemic Attack, Easy-to-Read, Pulmonary Nodule, 

Easy-to-Read


Forms:  ED Department Discharge


Referrals: 


Aditya Yao MD [Primary Care Provider] - 





- Discharge Summary/Plan Comment


DC Time >30 min.: Yes (45 min)





- Patient Data


Vitals - Most Recent: 


 Last Vital Signs











Temp  97.0 F   11/23/17 23:26


 


Pulse  49 L  11/23/17 23:26


 


Resp  19   11/23/17 23:26


 


BP  125/49 L  11/23/17 23:26


 


Pulse Ox  97   11/23/17 23:26











Weight - Most Recent: 173 lb 12.8 oz


I&O - Last 24 hours: 


 Intake & Output











 11/23/17 11/23/17 11/24/17





 14:59 22:59 06:59


 


Intake Total 120 1450 999


 


Balance 120 1450 999











Lab Results - Last 24 hrs: 


 Laboratory Results - last 24 hr











  11/23/17 11/23/17 11/23/17 Range/Units





  07:00 07:00 07:00 


 


WBC  6.51    (3.98-10.04)  K/mm3


 


RBC  3.77 L    (3.98-5.22)  M/mm3


 


Hgb  11.9    (11.2-15.7)  gm/L


 


Hct  34.7    (34.1-44.9)  %


 


MCV  92.0    (79.4-94.8)  fl


 


MCH  31.6    (25.6-32.2)  pg


 


MCHC  34.3    (32.2-35.5)  g/dl


 


RDW Std Deviation  45.3    (36.4-46.3)  fL


 


Plt Count  169 L    (182-369)  K/mm3


 


MPV  12.1    (9.4-12.3)  fl


 


Neut % (Auto)  48.9    (34.0-71.1)  %


 


Lymph % (Auto)  39.8    (19.3-51.7)  %


 


Mono % (Auto)  8.6    (4.7-12.5)  %


 


Eos % (Auto)  2.2    (0.7-5.8)  


 


Baso % (Auto)  0.3    (0.1-1.2)  %


 


Neut # (Auto)  3.19    (1.56-6.13)  K/mm3


 


Lymph # (Auto)  2.59    (1.18-3.74)  K/mm3


 


Mono # (Auto)  0.56 H    (0.24-0.36)  K/mm3


 


Eos # (Auto)  0.14    (0.04-0.36)  K/mm3


 


Baso # (Auto)  0.02    (0.01-0.08)  K/mm3


 


Sodium   143   (136-145)  mEq/L


 


Potassium   3.6   (3.5-5.1)  mEq/L


 


Chloride   111 H   ()  mEq/L


 


Carbon Dioxide   24   (21-32)  mEq/L


 


Anion Gap   11.6   (5-15)  


 


BUN   15   (7-18)  mg/dL


 


Creatinine   1.0   (0.55-1.02)  mg/dL


 


Est Cr Clr Drug Dosing   38.75   mL/min


 


Estimated GFR (MDRD)   53   (>60)  mL/min


 


BUN/Creatinine Ratio   15.0   (14-18)  


 


Glucose   88   ()  mg/dL


 


Calcium   8.8   (8.5-10.1)  mg/dL


 


Magnesium   1.7 L   (1.8-2.4)  mg/dl


 


C-Reactive Protein    < 0.2  (<1.0)  mg/dL











Med Orders - Current: 


 Current Medications





Acetaminophen (Tylenol)  650 mg PO Q4H PRN


   PRN Reason: Pain (Mild 1-3)/fever


Albuterol/Ipratropium (Duoneb 3.0-0.5 Mg/3 Ml)  3 ml NEB Q4H PRN


   PRN Reason: Shortness Of Breath/wheezing


Artificial Tears (Isopto Tears 0.5% Ophth Soln)  0 ml EYEBOTH QID [facility]


   Last Admin: 11/23/17 22:10 Dose:  1 drop


Betamethasone/Clotrimazole (Lotrisone)  0 gm TOP BID PRN


   PRN Reason: Rash


Bisacodyl (Dulcolax)  5 mg PO DAILY PRN


   PRN Reason: Constipation


Brimonidine Tartrate (Alphagan 0.2% Ophth Soln)  0 ml EYEBOTH BID [facility]


   Last Admin: 11/23/17 22:15 Dose:  1 drop


Colesevelam HCl (Welchol)  1,875 mg PO BID [facility]


   Last Admin: 11/23/17 22:20 Dose:  Not Given


Docusate Sodium (Colace)  100 mg PO BID PRN


   PRN Reason: Constipation


Dorzolamide HCl (Trusopt 2% Ophth Soln)  0 ml EYELF BID [facility]


   Last Admin: 11/23/17 22:00 Dose:  1 drop


Famotidine (Pepcid)  20 mg PO DAILY [facility]


   Last Admin: 11/23/17 10:51 Dose:  20 mg


Haloperidol Lactate (Haldol)  0.5 mg IVPUSH Q8H PRN


   PRN Reason: restlessness


Sodium Chloride (Normal Saline)  1,000 mls @ 75 mls/hr IV ASDIRECTED [facility]


   Last Admin: 11/23/17 13:00 Dose:  100 mls/hr


Latanoprost (Xalatan 0.005% Ophth Soln)  0 ml EYEBOTH BEDTIME [facility]


   Last Admin: 11/23/17 22:21 Dose:  1 drop


Levothyroxine Sodium (Levothyroxine)  150 mcg PO ACBREAKFAST [facility]


   Last Admin: 11/24/17 05:54 Dose:  Not Given


Lorazepam (Ativan)  0.5 mg IVPUSH Q8H PRN


   PRN Reason: Anxiety


   Last Admin: 11/23/17 00:21 Dose:  0.5 mg


Memantine (Namenda)  5 mg PO BID [facility]


   Last Admin: 11/23/17 22:19 Dose:  Not Given


Metformin HCl (Glucophage)  500 mg PO BIDMEALS [facility]


   Last Admin: 11/24/17 06:19 Dose:  Not Given


Metoprolol Tartrate (Lopressor)  50 mg PO DAILY [facility]


   Last Admin: 11/23/17 10:50 Dose:  50 mg


Metoprolol Tartrate (Lopressor)  25 mg PO DAILY@2100 [facility]


   Last Admin: 11/23/17 22:19 Dose:  Not Given


Timolol Maleate ( Timoptic) 0.5% Ophth Solution **Own Med* *  0 drop EYELF BID 

[facility]


   Last Admin: 11/23/17 22:05 Dose:  1 drop


Olanzapine (Zyprexa)  15 mg PO BEDTIME [facility]


   Last Admin: 11/23/17 22:20 Dose:  Not Given


Ondansetron HCl (Zofran)  4 mg IV Q6H PRN


   PRN Reason: Nausea/Vomiting


Polyethylene Glycol (Miralax)  17 gm PO DAILY PRN


   PRN Reason: Constipation


Senna/Docusate Sodium (Senna Plus)  1 tab PO BID PRN


   PRN Reason: Constipation





Discontinued Medications





Bisacodyl (Dulcolax)  10 mg RECTAL ONETIME ONE


   Stop: 11/24/17 04:21


   Last Admin: 11/24/17 05:54 Dose:  10 mg


Haloperidol Lactate (Haldol)  0.5 mg IVPUSH ONETIME ONE


   Stop: 11/22/17 09:22


   Last Admin: 11/22/17 09:39 Dose:  0.5 mg


Haloperidol Lactate (Haldol)  0.5 mg IVPUSH ONETIME ONE


   Stop: 11/22/17 14:33


   Last Admin: 11/22/17 14:38 Dose:  0.5 mg


Lactated Ringer's (Ringers, Lactated)  500 mls @ 999 mls/hr IV .BOLUS ONE


   Stop: 11/21/17 15:30


   Last Admin: 11/21/17 15:24 Dose:  999 mls/hr


Magnesium Sulfate 2 gm/ Premix  50 mls @ 25 mls/hr IV ONETIME ONE


   Stop: 11/21/17 22:58


   Last Admin: 11/21/17 21:52 Dose:  25 mls/hr


Magnesium Sulfate 2 gm/ Premix  50 mls @ 25 mls/hr IV ONETIME ONE


   Stop: 11/23/17 19:39


   Last Admin: 11/23/17 18:11 Dose:  25 mls/hr


Lorazepam (Ativan)  0.5 mg IVPUSH ONETIME ONE


   Stop: 11/22/17 09:23


   Last Admin: 11/22/17 09:40 Dose:  0.5 mg


Lorazepam (Ativan)  0.5 mg IVPUSH ONETIME ONE


   Stop: 11/22/17 14:32


   Last Admin: 11/22/17 14:38 Dose:  0.5 mg


Metoprolol Tartrate (Lopressor)  50 mg PO BID [facility]


   Last Admin: 11/21/17 23:59 Dose:  50 mg


Non-Formulary Medication (Brimonidine Tartrate)  1 drop EYEBOTH BID [facility]


Non-Formulary Medication (Memantine Hcl)  7 mg PO DAILY [facility]


Ondansetron HCl (Zofran Odt)  4 mg PO Q6H PRN


   PRN Reason: nausea, able to take PO


Timolol Maleate (Timoptic 0.25% Ophth Soln)  0 ml EYELF BID [facility]


   Last Admin: 11/23/17 10:54 Dose:  Not Given











*Q Meaningful Use (DIS)





- VTE *Q


VTE Criteria *Q: 








- Stroke *Q


Stroke Criteria *Q: 








- AMI *Q


AMI Criteria *Q:
**Discharge Summary





- Hospital Course


Free Text/Narrative:: 





Yara Gates is a 83 yo female who presented via ambulance from [place], 

where she lives in the nursing home there.  She  had worsening weakness over 

the past several weeks.  She was seen on the 11th of this month after falls and 

weakness and had a very extensive workup including lab work and head CT, which 

was essentially unremarkable. She was evaluated as a TIA, a CVA work up was 

performed;  see reports.  The exam as well as imaging was non focal.  SP, PT/OT 

were all preformed;  her diet was adjusted as needed based on her swallow 

evaluation. She was discharged back to the SNF.











Primary Dx





AMS with baseline advanced dementia


TIA  


Failure to Thrive


3mm lung nodule in non smoker











Condition


Stable











Disposition


SNF











Diet


Resume usual diet











New Meds


ASA 81 mg daily


Zocor 5 mg daily








- Discharge Data


Discharge Date: 11/24/17


Discharge Disposition: DC/Tfer to SNF 03


Condition: Good





- Patient Summary/Data


Consults: 


 Consultations





11/21/17 20:10


Consult to Case Management [CONS] Routine 


Consult to  [CONS] Routine 


OT Evaluation and Treatment [CONS] Routine 


PT Evaluation and Treatment [CONS] Routine 


SLP Evaluation and Treatment [CONS] Routine 














- Patient Instructions


Diet: Usual Diet as Tolerated


Activity: As Tolerated


Driving: Do Not Drive


Showering/Bathing: May Shower


Notify Provider of: Nausea and/or Vomiting





- Discharge Plan


Home Medications: 


 Home Meds





Calcium Carbonate [Tums] 200 mg PO DAILY PRN 07/30/17 [History]


Levothyroxine 150 mcg PO ACBREAKFAST 07/30/17 [History]


Sennosides [Senna] 8.6 mg PO DAILY PRN 07/30/17 [History]


Bisacodyl [Dulcolax] 10 mg RECTAL ASDIRECTED PRN 08/29/17 [History]


Brimonidine Tartrate [Alphagan P 0.1% Ophth Soln] 1 drop EYEBOTH BID 08/29/17 [

History]


[place]/Min Oil/Desire/Wool Alcoh [Eucerin Creme] 1 applic EARBOTH BID PRN 08/29/ 17 [History]


Colesevelam [Welchol] 3 tab PO BID 08/29/17 [History]


Dextran 70/Hypromellose [Artificial Tears] 1 drop EYEBOTH QID 08/29/17 [History]


Dorzolamide [Trusopt 2% Ophth Soln] 1 drop EYELF BID 08/29/17 [History]


Latanoprost [Xalatan 0.005% Ophth Soln] 1 drop EYEBOTH BEDTIME 08/29/17 [History

]


Metoprolol Tartrate [Lopressor] 50 mg PO BID 08/29/17 [History]


OLANZapine [Olanzapine] 15 mg PO BEDTIME 08/29/17 [History]


guaiFENesin/Dextromethorphan [Safetussin DM] 10 ml PO Q4H PRN 08/29/17 [History]


metFORMIN [Glucophage] 500 mg PO BID 08/29/17 [History]


Betamethasone/Clotrimazole [Lotrisone] 1 appful TOP BID PRN 09/08/17 [History]


Memantine HCl [Namenda XR] 7 mg PO DAILY 09/08/17 [History]


Acetaminophen [Tylenol] 650 mg PO Q6H PRN 11/11/17 [History]


Na Phos,M-B/Na Phos,Di-Ba [Fleet Enema] 30 ml RECTAL Q3D PRN 11/11/17 [History]


Polyethylene Glycol 3350 [Miralax] 17 gr PO DAILY 11/11/17 [History]


Timolol Maleate/PF [Timoptic 0.5% Ocudose Drop] 1 drop EYELF BID 11/23/17 [

History]








Patient Handouts:  Transient Ischemic Attack, Easy-to-Read, Pulmonary Nodule, 

Easy-to-Read


Forms:  ED Department Discharge


Referrals: 


Aditya Yao MD [Primary Care Provider] - 





- Discharge Summary/Plan Comment


DC Time >30 min.: No





- General Info


Date of Service: 11/21/17


Functional Status: Reports: Tolerating Diet, Ambulating, Urinating





- Review of Systems


General: Reports: No Symptoms


HEENT: Reports: No Symptoms


Pulmonary: Reports: No Symptoms


Cardiovascular: Reports: No Symptoms


Gastrointestinal: Reports: No Symptoms


Genitourinary: Reports: No Symptoms


Musculoskeletal: Reports: No Symptoms


Skin: Reports: No Symptoms


Neurological: Reports: No Symptoms


Psychiatric: Reports: No Symptoms





- Patient Data


Vitals - Most Recent: 


 Last Vital Signs











Temp  36.1 C   11/23/17 23:26


 


Pulse  49 L  11/23/17 23:26


 


Resp  19   11/23/17 23:26


 


BP  125/49 L  11/23/17 23:26


 


Pulse Ox  97   11/23/17 23:26











Weight - Most Recent: 78.834 kg


I&O - Last 24 hours: 


 Intake & Output











 11/23/17 11/24/17 11/24/17





 22:59 06:59 14:59


 


Intake Total 1450 999 


 


Balance 1450 999 











Lab Results - Last 24 hrs: 


 Laboratory Results - last 24 hr











  11/24/17 11/24/17 11/24/17 Range/Units





  06:50 06:50 06:50 


 


WBC  6.61    (3.98-10.04)  K/mm3


 


RBC  4.09    (3.98-5.22)  M/mm3


 


Hgb  12.7    (11.2-15.7)  gm/L


 


Hct  37.2    (34.1-44.9)  %


 


MCV  91.0    (79.4-94.8)  fl


 


MCH  31.1    (25.6-32.2)  pg


 


MCHC  34.1    (32.2-35.5)  g/dl


 


RDW Std Deviation  45.5    (36.4-46.3)  fL


 


Plt Count  138 L    (182-369)  K/mm3


 


MPV  12.6 H    (9.4-12.3)  fl


 


Neut % (Auto)  50.7    (34.0-71.1)  %


 


Lymph % (Auto)  38.1    (19.3-51.7)  %


 


Mono % (Auto)  8.8    (4.7-12.5)  %


 


Eos % (Auto)  2.0    (0.7-5.8)  


 


Baso % (Auto)  0.2    (0.1-1.2)  %


 


Neut # (Auto)  3.36    (1.56-6.13)  K/mm3


 


Lymph # (Auto)  2.52    (1.18-3.74)  K/mm3


 


Mono # (Auto)  0.58 H    (0.24-0.36)  K/mm3


 


Eos # (Auto)  0.13    (0.04-0.36)  K/mm3


 


Baso # (Auto)  0.01    (0.01-0.08)  K/mm3


 


Sodium   143   (136-145)  mEq/L


 


Potassium   3.8   (3.5-5.1)  mEq/L


 


Chloride   110 H   ()  mEq/L


 


Carbon Dioxide   21   (21-32)  mEq/L


 


Anion Gap   15.8 H   (5-15)  


 


BUN   14   (7-18)  mg/dL


 


Creatinine   1.0   (0.55-1.02)  mg/dL


 


Est Cr Clr Drug Dosing   38.75   mL/min


 


Estimated GFR (MDRD)   53   (>60)  mL/min


 


BUN/Creatinine Ratio   14.0   (14-18)  


 


Glucose   74 L   ()  mg/dL


 


Calcium   9.0   (8.5-10.1)  mg/dL


 


Magnesium   2.1   (1.8-2.4)  mg/dl


 


C-Reactive Protein    < 0.2  (<1.0)  mg/dL











Med Orders - Current: 


 Current Medications





Acetaminophen (Tylenol)  650 mg PO Q4H PRN


   PRN Reason: Pain (Mild 1-3)/fever


Albuterol/Ipratropium (Duoneb 3.0-0.5 Mg/3 Ml)  3 ml NEB Q4H PRN


   PRN Reason: Shortness Of Breath/wheezing


Artificial Tears (Isopto Tears 0.5% Ophth Soln)  0 ml EYEBOTH QID [facility]


   Last Admin: 11/23/17 22:10 Dose:  1 drop


Betamethasone/Clotrimazole (Lotrisone)  0 gm TOP BID PRN


   PRN Reason: Rash


Bisacodyl (Dulcolax)  5 mg PO DAILY PRN


   PRN Reason: Constipation


Brimonidine Tartrate (Alphagan 0.2% Ophth Soln)  0 ml EYEBOTH BID [facility]


   Last Admin: 11/23/17 22:15 Dose:  1 drop


Colesevelam HCl (Welchol)  1,875 mg PO BID [facility]


   Last Admin: 11/23/17 22:20 Dose:  Not Given


Docusate Sodium (Colace)  100 mg PO BID PRN


   PRN Reason: Constipation


Dorzolamide HCl (Trusopt 2% Ophth Soln)  0 ml EYELF BID [facility]


   Last Admin: 11/23/17 22:00 Dose:  1 drop


Famotidine (Pepcid)  20 mg PO DAILY [facility]


   Last Admin: 11/23/17 10:51 Dose:  20 mg


Haloperidol Lactate (Haldol)  0.5 mg IVPUSH Q8H PRN


   PRN Reason: restlessness


Sodium Chloride (Normal Saline)  1,000 mls @ 75 mls/hr IV ASDIRECTED [facility]


   Last Admin: 11/24/17 07:57 Dose:  50 mls/hr


Latanoprost (Xalatan 0.005% Ophth Soln)  0 ml EYEBOTH BEDTIME [facility]


   Last Admin: 11/23/17 22:21 Dose:  1 drop


Levothyroxine Sodium (Levothyroxine)  150 mcg PO ACBREAKFAST [facility]


   Last Admin: 11/24/17 05:54 Dose:  Not Given


Lorazepam (Ativan)  0.5 mg IVPUSH Q8H PRN


   PRN Reason: Anxiety


   Last Admin: 11/24/17 08:49 Dose:  0.5 mg


Memantine (Namenda)  5 mg PO BID [facility]


   Last Admin: 11/23/17 22:19 Dose:  Not Given


Metformin HCl (Glucophage)  500 mg PO BIDMEALS [facility]


   Last Admin: 11/24/17 06:19 Dose:  Not Given


Metoprolol Tartrate (Lopressor)  50 mg PO DAILY [facility]


   Last Admin: 11/23/17 10:50 Dose:  50 mg


Metoprolol Tartrate (Lopressor)  25 mg PO DAILY@2100 [facility]


   Last Admin: 11/23/17 22:19 Dose:  Not Given


Timolol Maleate ( Timoptic) 0.5% Ophth Solution **Own Med* *  0 drop EYELF BID 

[facility]


   Last Admin: 11/23/17 22:05 Dose:  1 drop


Olanzapine (Zyprexa)  15 mg PO BEDTIME [facility]


   Last Admin: 11/23/17 22:20 Dose:  Not Given


Ondansetron HCl (Zofran)  4 mg IV Q6H PRN


   PRN Reason: Nausea/Vomiting


Polyethylene Glycol (Miralax)  17 gm PO DAILY PRN


   PRN Reason: Constipation


Senna/Docusate Sodium (Senna Plus)  1 tab PO BID PRN


   PRN Reason: Constipation





Discontinued Medications





Bisacodyl (Dulcolax)  10 mg RECTAL ONETIME ONE


   Stop: 11/24/17 04:21


   Last Admin: 11/24/17 05:54 Dose:  10 mg


Haloperidol Lactate (Haldol)  0.5 mg IVPUSH ONETIME ONE


   Stop: 11/22/17 09:22


   Last Admin: 11/22/17 09:39 Dose:  0.5 mg


Haloperidol Lactate (Haldol)  0.5 mg IVPUSH ONETIME ONE


   Stop: 11/22/17 14:33


   Last Admin: 11/22/17 14:38 Dose:  0.5 mg


Lactated Ringer's (Ringers, Lactated)  500 mls @ 999 mls/hr IV .BOLUS ONE


   Stop: 11/21/17 15:30


   Last Admin: 11/21/17 15:24 Dose:  999 mls/hr


Magnesium Sulfate 2 gm/ Premix  50 mls @ 25 mls/hr IV ONETIME ONE


   Stop: 11/21/17 22:58


   Last Admin: 11/21/17 21:52 Dose:  25 mls/hr


Magnesium Sulfate 2 gm/ Premix  50 mls @ 25 mls/hr IV ONETIME ONE


   Stop: 11/23/17 19:39


   Last Admin: 11/23/17 18:11 Dose:  25 mls/hr


Lorazepam (Ativan)  0.5 mg IVPUSH ONETIME ONE


   Stop: 11/22/17 09:23


   Last Admin: 11/22/17 09:40 Dose:  0.5 mg


Lorazepam (Ativan)  0.5 mg IVPUSH ONETIME ONE


   Stop: 11/22/17 14:32


   Last Admin: 11/22/17 14:38 Dose:  0.5 mg


Metoprolol Tartrate (Lopressor)  50 mg PO BID [facility]


   Last Admin: 11/21/17 23:59 Dose:  50 mg


Non-Formulary Medication (Brimonidine Tartrate)  1 drop EYEBOTH BID [facility]


Non-Formulary Medication (Memantine Hcl)  7 mg PO DAILY [facility]


Ondansetron HCl (Zofran Odt)  4 mg PO Q6H PRN


   PRN Reason: nausea, able to take PO


Timolol Maleate (Timoptic 0.25% Ophth Soln)  0 ml EYELF BID [facility]


   Last Admin: 11/23/17 10:54 Dose:  Not Given











- Exam


Quality Assessment: Reports: DVT Prophylaxis


General: Reports: Alert, Oriented, Cooperative, No Acute Distress


HEENT: Reports: Pupils Equal, Pupils Reactive, EOMI


Neck: Reports: Supple, Trachea Midline


Lungs: Reports: Normal Respiratory Effort


Cardiovascular: Reports: Regular Rate


GI/Abdominal Exam: Normal Bowel Sounds, Soft, Non-Tender, No Organomegaly, No 

Distention


 (Female) Exam: Deferred


Rectal (Female) Exam: Deferred


Back Exam: Reports: Normal Inspection


Extremities: Normal Inspection


Skin: Reports: Warm


Neurological: Reports: No New Focal Deficit


Psy/Mental Status: Reports: Alert





*Q Meaningful Use (DIS)





- VTE *Q


VTE Criteria *Q: 








- Stroke *Q


Stroke Criteria *Q: 








- AMI *Q


AMI Criteria *Q:
Not applicable

## 2019-06-18 ENCOUNTER — HOSPITAL ENCOUNTER (EMERGENCY)
Dept: HOSPITAL 41 - JD.ED | Age: 84
Discharge: NURSING, REHAB OR LONG-TERM CARE | End: 2019-06-18
Payer: MEDICARE

## 2019-06-18 VITALS — SYSTOLIC BLOOD PRESSURE: 131 MMHG | DIASTOLIC BLOOD PRESSURE: 67 MMHG

## 2019-06-18 DIAGNOSIS — Z79.82: ICD-10-CM

## 2019-06-18 DIAGNOSIS — Z79.899: ICD-10-CM

## 2019-06-18 DIAGNOSIS — Z88.0: ICD-10-CM

## 2019-06-18 DIAGNOSIS — F02.80: ICD-10-CM

## 2019-06-18 DIAGNOSIS — I10: ICD-10-CM

## 2019-06-18 DIAGNOSIS — Z88.8: ICD-10-CM

## 2019-06-18 DIAGNOSIS — M10.9: ICD-10-CM

## 2019-06-18 DIAGNOSIS — E03.9: ICD-10-CM

## 2019-06-18 DIAGNOSIS — Z88.5: ICD-10-CM

## 2019-06-18 DIAGNOSIS — E66.9: ICD-10-CM

## 2019-06-18 DIAGNOSIS — E11.9: ICD-10-CM

## 2019-06-18 DIAGNOSIS — G30.9: ICD-10-CM

## 2019-06-18 DIAGNOSIS — R04.0: Primary | ICD-10-CM

## 2019-06-18 PROCEDURE — 30903 CONTROL OF NOSEBLEED: CPT

## 2019-06-18 PROCEDURE — 99283 EMERGENCY DEPT VISIT LOW MDM: CPT

## 2019-06-18 NOTE — EDM.PDOC
ED HPI GENERAL MEDICAL PROBLEM





- General


Chief Complaint: ENT Problem


Stated Complaint: NOSE BLEED


Time Seen by Provider: 06/18/19 17:01


Source of Information: Reports: Patient, Family, RN Notes Reviewed


History Limitations: Reports: No Limitations





- History of Present Illness


INITIAL COMMENTS - FREE TEXT/NARRATIVE: 





Patient is a 83-year-old female who presents to the ED for evaluation of a 

nosebleed.  The patient is a resident of the [facility].  The nose 

bleed developed around 3 or 3:15 PM this afternoon, this came on sudden and has 

not let up since.  They have packed the nostril with gauze around 4 times, 

however it does seem to keep seeping.  The bleeding is coming from the right 

nostril.  The patient is only on a small baby aspirin daily.





- Related Data


 Allergies











Allergy/AdvReac Type Severity Reaction Status Date / Time


 


atorvastatin Allergy  Other Verified 06/18/19 16:59


 


codeine Allergy  Other Verified 06/18/19 16:59


 


losartan Allergy  Other Verified 06/18/19 16:59


 


morphine Allergy  Other Verified 06/18/19 16:59


 


Penicillins Allergy  Other Verified 06/18/19 16:59











Home Meds: 


 Home Meds





Acetaminophen [Tylenol] 650 mg PO Q6H PRN 01/21/18 [History]


Aspirin 81 mg PO DAILY 01/21/18 [History]


Levothyroxine 150 mcg PO DAILY 01/21/18 [History]


Memantine HCl [Namenda Xr] 7 mg PO DAILY 01/21/18 [History]


OLANZapine [ZyPREXA Zydis] 15 mg PO BEDTIME 01/21/18 [History]


Latanoprost [Xalatan 0.005% Ophth Soln] 1 drop EYEBOTH BEDTIME 01/22/18 [History

]


Polyethylene Glycol 3350 [MiraLAX] 17 gm PO Q2D  packet 01/22/18 [Rx]


Calcium Carbonate [Tums] 200 mg PO ASDIRECTED 10/27/18 [History]


Citalopram Hydrobromide [Celexa] 10 mg PO BEDTIME 10/27/18 [History]


Dorzolamide/Timolol [Cosopt 2%-0.5% Ophth Soln] 1 drop EYEBOTH BID 10/27/18 [

History]


Melatonin 3 mg PO BEDTIME 10/27/18 [History]


Naproxen Sodium [Aleve] 220 mg PO BID 10/27/18 [History]


OLANZapine [Olanzapine] 5 mg PO DAILY PRN 10/27/18 [History]


Cholecalciferol (Vitamin D3) [Vitamin D3] 1,000 unit PO DAILY #30 tablet 10/30/

18 [Rx]


Furosemide [Lasix] 40 mg PO DAILY #0 10/30/18 [Rx]


Magnesium Hydroxide [Milk of Magnesia] 5 ml PO TID PRN 06/18/19 [History]


Trolamine Salicylate/Aloe Vera [Aspercreme 10%] 1 applic TOP BID PRN 06/18/19 [

History]


guaiFENesin [Mucinex] 600 mg PO BID 06/18/19 [History]











Past Medical History


HEENT History: Reports: Glaucoma, Impaired Vision, Other (See Below)


Other HEENT History: dry eyes syndrome, wears eyeglasses. blind right eye


Cardiovascular History: Reports: High Cholesterol, Hypertension


Other Cardiovascular History: edema


Respiratory History: Reports: None


Gastrointestinal History: Reports: Chronic Constipation, GERD


Other Gastrointestinal History: dysphagia Novemver 2017


Genitourinary History: Reports: Renal Disease, Other (See Below)


Other Genitourinary History: hx hyperglycemic--hyperosmolar coma ([facility])


OB/GYN History: Reports: Pregnancy


Musculoskeletal History: Reports: Gout


Other Musculoskeletal History: uses walker


Neurological History: Reports: Alzheimers Disease, Other (See Below)


Other Neuro History: encephalopathy, AMS


Psychiatric History: Reports: Alzheimers Disease, Dementia, Schizophrenia, 

Other (See Below)


Other Psychiatric History: delirium


Endocrine/Metabolic History: Reports: Diabetes, Type II, Hypothyroidism, Obesity

/BMI 30+


Other Endocrine/Metabolic History: hx hyperglycemic--hyperosmolar coma ([facility])


Hematologic History: Reports: None


Immunologic History: Reports: None


Oncologic (Cancer) History: Reports: None


Dermatologic History: Reports: None





- Infectious Disease History


Infectious Disease History: Reports: Other (See Below)


Other Infectious Disease History: none per record. MRSA swab sent on this stay





- Past Surgical History


Head Surgeries/Procedures: Reports: None


Other Endocrine Surgeries/Procedures: thyroid surgery


Neurological Surgical History: Reports: None





Social & Family History





- Family History


Family Medical History: Noncontributory





- Tobacco Use


Smoking Status *Q: Never Smoker





- Caffeine Use


Caffeine Use: Reports: None





- Recreational Drug Use


Recreational Drug Use: No





- Living Situation & Occupation


Living situation: Reports: Extended Care Facility (Currently living in the 

[facility]


Occupation: Retired





ED ROS ENT





- Review of Systems


Review Of Systems: See Below


Constitutional: Reports: No Symptoms


HEENT: Reports: Nosebleed


Respiratory: Reports: No Symptoms


Cardiovascular: Reports: No Symptoms


Endocrine: Reports: No Symptoms


GI/Abdominal: Reports: No Symptoms


: Reports: No Symptoms


Musculoskeletal: Reports: No Symptoms


Skin: Reports: No Symptoms


Neurological: Reports: No Symptoms


Psychiatric: Reports: No Symptoms


Hematologic/Lymphatic: Reports: No Symptoms


Immunologic: Reports: No Symptoms





ED EXAM, ENT





- Physical Exam


Exam: See Below


Exam Limited By: No Limitations


General Appearance: Alert, WD/WN, No Apparent Distress


Eye Exam: Bilateral Eye: Normal Inspection


Ears: Normal External Exam, Normal TMs


Nose: Normal Inspection, Active Bleeding (small area of non-active bleeding on 

anterior R medial nare, and 1 area that is bounding with pulses further back, 

suspicious to re-bleed.)


Mouth/Throat: Normal Inspection, Normal Oropharynx (dried blood noted to tongue 

and posterior pharynx), Normal Teeth


Head: Atraumatic, Normocephalic


Neck: Normal Inspection


Respiratory/Chest: No Respiratory Distress, Lungs Clear, Normal Breath Sounds, 

No Accessory Muscle Use, Chest Non-Tender


Cardiovascular: Normal Peripheral Pulses, Regular Rate, Rhythm, No Murmur


Extremities: Normal Inspection, Normal Capillary Refill


Neurological: Alert, Oriented, Normal Cognition, No Motor/Sensory Deficits


Psychiatric: Normal Affect, Normal Mood


Skin: Warm, Dry, Intact, Normal Color, No Rash





Course





- Vital Signs


Last Recorded V/S: 


 Last Vital Signs











Temp  97.3 F   06/18/19 16:56


 


Pulse  64   06/18/19 16:56


 


Resp  16   06/18/19 16:56


 


BP  131/67   06/18/19 16:56


 


Pulse Ox  97   06/18/19 16:56














- Orders/Labs/Meds


Meds: 


Medications














Discontinued Medications














Generic Name Dose Route Start Last Admin





  Trade Name Freq  PRN Reason Stop Dose Admin


 


Cocaine HCl  1 ml  06/18/19 17:11  06/18/19 17:23





  Cocaine Hcl  TOP  06/18/19 17:12  1 ml





  ONETIME ONE   Administration





     





     





     





     


 


Oxymetazoline HCl  1 ml  06/18/19 17:12  06/18/19 17:23





  Nasal Decongestant Spray  RUTH ANN  06/18/19 17:13  1 spray





  ONETIME ONE   Administration





     





     





     





     














- Re-Assessments/Exams


Free Text/Narrative Re-Assessment/Exam: 





06/18/19 17:33


Patient presents to the ED for the evaluation of nosebleed.  The gauze did come 

out okay and there area was not bleeding on first exam, as I went to get the 

silver nitrate the area started actively bleeding again.  I have ordered 

topical cocaine and Afrin and with the help of Dr. Kimball, have packed the 

nose at 1730, this will be in place for 20 minutes and we will reassess after 

that.





06/18/19 17:40


RN informs me that the bleeding is starting out the other nostril now, and into 

back of throat. I have packed the other nostril with the remaining tube gauze 

soaked in the cocaine/afrin mix. Pt has been given ice chips to suck on to try 

and help alleviate the bleeding.





06/18/19 18:08


The cocaine/afrin packing was taken out with a resultant large clot. There is 

still an area of active arterial bleeding. This was assessed by myself and Dr. Kimball, an Anterior Rhino rocket was placed and will need to stay in for 48 

hours. After this time, she will need to be assessed by an ENT





Departure





- Departure


Time of Disposition: 18:15


Disposition: DC/Tfer to SNF 03


Condition: Fair


Clinical Impression: 


 Epistaxis








- Discharge Information


*PRESCRIPTION DRUG MONITORING PROGRAM REVIEWED*: No


*COPY OF PRESCRIPTION DRUG MONITORING REPORT IN PATIENT JESSE: No


Instructions:  Nosebleed, Easy-to-Read


Referrals: 


Aditya Yao MD [Primary Care Provider] - 


Forms:  ED Department Discharge


Additional Instructions: 


You have been evaluated in the ED today for your nosebleed.





Packing was attempted first, however the bleeding persisted, you were 

ultimately treated with a Rhino Rocket to help alleviate the bleeding.  This 

will need to be left in place for 48 hours. You will need to be seen by your 

primary care provider, so that they may take the Rhino rocket out.





You may take 500mg Tylenol or 600mg ibuprofen Q6H PRN for pain relief.





Please return to the ED if the bleeding re-occurs as the rhino rocket will need 

to be taken out and you will need to re-assessed.





Please return to the ED if your symptoms should change or worsen.

## 2019-06-22 ENCOUNTER — HOSPITAL ENCOUNTER (OUTPATIENT)
Dept: HOSPITAL 41 - JD.ED | Age: 84
Setting detail: OBSERVATION
Discharge: NURSING, REHAB OR LONG-TERM CARE | End: 2019-06-22
Attending: INTERNAL MEDICINE | Admitting: INTERNAL MEDICINE
Payer: MEDICARE

## 2019-06-22 VITALS — DIASTOLIC BLOOD PRESSURE: 85 MMHG | SYSTOLIC BLOOD PRESSURE: 145 MMHG

## 2019-06-22 DIAGNOSIS — I10: ICD-10-CM

## 2019-06-22 DIAGNOSIS — F20.9: ICD-10-CM

## 2019-06-22 DIAGNOSIS — Z66: ICD-10-CM

## 2019-06-22 DIAGNOSIS — R09.89: ICD-10-CM

## 2019-06-22 DIAGNOSIS — H04.129: ICD-10-CM

## 2019-06-22 DIAGNOSIS — Z79.899: ICD-10-CM

## 2019-06-22 DIAGNOSIS — E66.9: ICD-10-CM

## 2019-06-22 DIAGNOSIS — K21.9: ICD-10-CM

## 2019-06-22 DIAGNOSIS — Z88.5: ICD-10-CM

## 2019-06-22 DIAGNOSIS — R60.9: ICD-10-CM

## 2019-06-22 DIAGNOSIS — H40.9: ICD-10-CM

## 2019-06-22 DIAGNOSIS — Z88.8: ICD-10-CM

## 2019-06-22 DIAGNOSIS — G30.9: ICD-10-CM

## 2019-06-22 DIAGNOSIS — Z88.0: ICD-10-CM

## 2019-06-22 DIAGNOSIS — F32.9: ICD-10-CM

## 2019-06-22 DIAGNOSIS — E78.5: ICD-10-CM

## 2019-06-22 DIAGNOSIS — H54.61: ICD-10-CM

## 2019-06-22 DIAGNOSIS — E03.9: ICD-10-CM

## 2019-06-22 DIAGNOSIS — F02.80: ICD-10-CM

## 2019-06-22 DIAGNOSIS — K59.09: ICD-10-CM

## 2019-06-22 DIAGNOSIS — M10.9: ICD-10-CM

## 2019-06-22 DIAGNOSIS — E11.9: ICD-10-CM

## 2019-06-22 DIAGNOSIS — R04.0: Primary | ICD-10-CM

## 2019-06-22 DIAGNOSIS — T17.998A: ICD-10-CM

## 2019-06-22 DIAGNOSIS — N28.9: ICD-10-CM

## 2019-06-22 PROCEDURE — 30903 CONTROL OF NOSEBLEED: CPT

## 2019-06-22 PROCEDURE — 71045 X-RAY EXAM CHEST 1 VIEW: CPT

## 2019-06-22 PROCEDURE — 99285 EMERGENCY DEPT VISIT HI MDM: CPT

## 2019-06-22 PROCEDURE — 71046 X-RAY EXAM CHEST 2 VIEWS: CPT

## 2019-06-22 PROCEDURE — 96365 THER/PROPH/DIAG IV INF INIT: CPT

## 2019-06-22 PROCEDURE — 80048 BASIC METABOLIC PNL TOTAL CA: CPT

## 2019-06-22 PROCEDURE — 36415 COLL VENOUS BLD VENIPUNCTURE: CPT

## 2019-06-22 PROCEDURE — G0378 HOSPITAL OBSERVATION PER HR: HCPCS

## 2019-06-22 PROCEDURE — 80053 COMPREHEN METABOLIC PANEL: CPT

## 2019-06-22 PROCEDURE — 85025 COMPLETE CBC W/AUTO DIFF WBC: CPT

## 2019-06-22 PROCEDURE — 87641 MR-STAPH DNA AMP PROBE: CPT

## 2019-06-22 NOTE — EDM.PDOC
ED HPI GENERAL MEDICAL PROBLEM





- General


Chief Complaint: ENT Problem


Stated Complaint: [facility]


Time Seen by Provider: 06/22/19 01:54


Source of Information: Reports: EMS, Nursing Home Records


History Limitations: Reports: Altered Mental Status





- History of Present Illness


INITIAL COMMENTS - FREE TEXT/NARRATIVE: 





This is a 83-year-old female. She was brought to the ER by ambulance from the 

[facility]. On Monday she was seen here for epistaxis from the 

right nasal passage and on Wednesday the Rhino Rocket was removed at the 

nursing home. This evening she started having bleeding from the right nasal 

passage again for which they put in a tampon and called the ambulance. The 

patient has been gurgling blood since the onset of the nosebleed and the tampon 

fell out prior to making it to the ER. When she arrived she had marked gurgling 

and blood in the back of her throat she was complaining of inability to breathe 

and we began to suction her immediately and I placed another Rhino Rocket 7.5 

cm in the right nasal passage coated with cocaine and inflated it until the 

bleeding stopped. We were able that time to clean out the blood clot from the 

back of her throat and she was able to breathe easier. Her pulse ox did drop 

into the 70s and she first arrived but it's now with a nonrebreather up into 

the upper 90s.





The patient is a DNR and has dementia and multiple other medical problems.





- Related Data


 Allergies











Allergy/AdvReac Type Severity Reaction Status Date / Time


 


atorvastatin Allergy  Other Verified 06/22/19 01:36


 


codeine Allergy  Other Verified 06/22/19 01:36


 


losartan Allergy  Other Verified 06/22/19 01:36


 


morphine Allergy  Other Verified 06/22/19 01:36


 


Penicillins Allergy  Other Verified 06/22/19 01:36











Home Meds: 


 Home Meds





Acetaminophen [Tylenol] 650 mg PO Q6H PRN 01/21/18 [History]


Aspirin 81 mg PO DAILY 01/21/18 [History]


Levothyroxine 150 mcg PO DAILY 01/21/18 [History]


Memantine HCl [Namenda Xr] 7 mg PO DAILY 01/21/18 [History]


OLANZapine [ZyPREXA Zydis] 20 mg PO BEDTIME 01/21/18 [History]


Latanoprost [Xalatan 0.005% Ophth Soln] 1 drop EYEBOTH BEDTIME 01/22/18 [History

]


Polyethylene Glycol 3350 [MiraLAX] 17 gm PO Q2D  packet 01/22/18 [Rx]


Calcium Carbonate [Tums] 200 mg PO ASDIRECTED PRN 10/27/18 [History]


Citalopram Hydrobromide [Celexa] 10 mg PO BEDTIME 10/27/18 [History]


Dorzolamide/Timolol [Cosopt 2%-0.5% Ophth Soln] 1 drop EYEBOTH BID 10/27/18 [

History]


Melatonin 3 mg PO BEDTIME 10/27/18 [History]


Naproxen Sodium [Aleve] 220 mg PO BID 10/27/18 [History]


OLANZapine [Olanzapine] 2.5 mg PO DAILY PRN 10/27/18 [History]


Cholecalciferol (Vitamin D3) [Vitamin D3] 1,000 unit PO DAILY #30 tablet 10/30/

18 [Rx]


Furosemide [Lasix] 40 mg PO DAILY #0 10/30/18 [Rx]


Magnesium Hydroxide [Milk of Magnesia] 5 ml PO TID PRN 06/18/19 [History]


Trolamine Salicylate/Aloe Vera [Aspercreme 10%] 1 applic TOP BID PRN 06/18/19 [

History]


guaiFENesin [Mucinex] 600 mg PO BID PRN 06/18/19 [History]











Past Medical History


HEENT History: Reports: Glaucoma, Impaired Vision, Other (See Below)


Other HEENT History: dry eyes syndrome, wears eyeglasses. blind right eye


Cardiovascular History: Reports: High Cholesterol, Hypertension


Other Cardiovascular History: edema


Respiratory History: Reports: None


Gastrointestinal History: Reports: Chronic Constipation, GERD


Other Gastrointestinal History: dysphagia Novemver 2017


Genitourinary History: Reports: Renal Disease, Other (See Below)


Other Genitourinary History: hx hyperglycemic--hyperosmolar coma ([facility])


OB/GYN History: Reports: Pregnancy


Musculoskeletal History: Reports: Gout


Other Musculoskeletal History: uses walker


Neurological History: Reports: Alzheimers Disease, Other (See Below)


Other Neuro History: encephalopathy, AMS


Psychiatric History: Reports: Alzheimers Disease, Dementia, Schizophrenia, 

Other (See Below)


Other Psychiatric History: delirium


Endocrine/Metabolic History: Reports: Diabetes, Type II, Hypothyroidism, Obesity

/BMI 30+


Other Endocrine/Metabolic History: hx hyperglycemic--hyperosmolar coma ([facility])


Hematologic History: Reports: None


Immunologic History: Reports: None


Oncologic (Cancer) History: Reports: None


Dermatologic History: Reports: None





- Infectious Disease History


Infectious Disease History: Reports: Other (See Below)


Other Infectious Disease History: none per record. MRSA swab sent on this stay





- Past Surgical History


Head Surgeries/Procedures: Reports: None


Other Endocrine Surgeries/Procedures: thyroid surgery


Neurological Surgical History: Reports: None





Social & Family History





- Family History


Family Medical History: Noncontributory





- Tobacco Use


Smoking Status *Q: Never Smoker





- Caffeine Use


Caffeine Use: Reports: None





- Recreational Drug Use


Recreational Drug Use: No





- Living Situation & Occupation


Living situation: Reports: Extended Care Facility (Currently living in the 

[facility])


Occupation: Retired





ED ROS ENT





- Review of Systems


Review Of Systems: Unable To Obtain


HEENT: Reports: Other (Epistaxis)


Respiratory: Reports: Shortness of Breath





ED EXAM, ENT





- Physical Exam


Exam: See Below


Exam Limited By: Altered Mental Status


General Appearance: Alert, Anxious, Moderate Distress


Eye Exam: Bilateral Eye: Normal Inspection


Ears: Normal External Exam


Nose: Active Bleeding, Other (She is noted to have active bleeding from the 

right and the left nasal passage as well as marked congestion of blood in her 

mouth and her oral pharynx with gurgling, she is complaining of inability to 

breathe)


Mouth/Throat: Other (Marked blood in the mouth)


Head: Normocephalic


Neck: Supple


Respiratory/Chest: Respiratory Distress, Rhonchi, Wheezing.  No: Stridor


Cardiovascular: Regular Rate, Rhythm


GI/Abdominal: Soft, Non-Tender


Back: Decreased Range of Motion


Extremities: Normal Inspection, Other (Minimal peripheral edema)


Neurological: Alert, Disoriented.  No: Oriented


Psychiatric: Anxious


Skin: Warm, Dry





Course





- Vital Signs


Last Recorded V/S: 


 Last Vital Signs











Temp  97.4 F   06/22/19 01:36


 


Pulse  79   06/22/19 01:36


 


Resp  20   06/22/19 01:36


 


BP  134/66   06/22/19 01:36


 


Pulse Ox  71 L  06/22/19 01:36














- Orders/Labs/Meds


Orders: 


 Active Orders 24 hr











 Category Date Time Status


 


 Chest 1V Frontal [CR] Stat Exams  06/22/19 02:06 Taken











Labs: 


 Laboratory Tests











  06/22/19 06/22/19 Range/Units





  01:46 01:46 


 


WBC  9.71   (3.98-10.04)  K/mm3


 


RBC  3.46 L   (3.98-5.22)  M/mm3


 


Hgb  10.8 L D   (11.2-15.7)  gm/L


 


Hct  33.1 L   (34.1-44.9)  %


 


MCV  95.7 H D   (79.4-94.8)  fl


 


MCH  31.2   (25.6-32.2)  pg


 


MCHC  32.6   (32.2-35.5)  g/dl


 


RDW Std Deviation  45.1   (36.4-46.3)  fL


 


Plt Count  222   (182-369)  K/mm3


 


MPV  11.3   (9.4-12.3)  fl


 


Neut % (Auto)  54.6   (34.0-71.1)  %


 


Lymph % (Auto)  35.2   (19.3-51.7)  %


 


Mono % (Auto)  7.2   (4.7-12.5)  %


 


Eos % (Auto)  2.6   (0.7-5.8)  


 


Baso % (Auto)  0.2   (0.1-1.2)  %


 


Neut # (Auto)  5.30   (1.56-6.13)  K/mm3


 


Lymph # (Auto)  3.42   (1.18-3.74)  K/mm3


 


Mono # (Auto)  0.70 H   (0.24-0.36)  K/mm3


 


Eos # (Auto)  0.25   (0.04-0.36)  K/mm3


 


Baso # (Auto)  0.02   (0.01-0.08)  K/mm3


 


Sodium   143  (136-145)  mEq/L


 


Potassium   3.5  (3.5-5.1)  mEq/L


 


Chloride   106  ()  mEq/L


 


Carbon Dioxide   27  (21-32)  mEq/L


 


Anion Gap   13.5  (5-15)  


 


BUN   66 H D  (7-18)  mg/dL


 


Creatinine   1.8 H  (0.55-1.02)  mg/dL


 


Est Cr Clr Drug Dosing   TNP  


 


Estimated GFR (MDRD)   27  (>60)  mL/min


 


BUN/Creatinine Ratio   36.7 H  (14-18)  


 


Glucose   177 H  ()  mg/dL


 


Calcium   9.0  (8.5-10.1)  mg/dL


 


Total Bilirubin   0.4  (0.2-1.0)  mg/dL


 


AST   14 L  (15-37)  U/L


 


ALT   15  (14-59)  U/L


 


Alkaline Phosphatase   72  ()  U/L


 


Total Protein   7.1  (6.4-8.2)  g/dl


 


Albumin   3.4  (3.4-5.0)  g/dl


 


Globulin   3.7  gm/dL


 


Albumin/Globulin Ratio   0.9 L  (1-2)  














- Radiology Interpretation


Free Text/Narrative:: 





Chest x-ray suggested she might have aspirated into the right lung.  No other 

acute findings.





- Re-Assessments/Exams


Free Text/Narrative Re-Assessment/Exam: 





06/22/19 02:39


Once we are able to get the Rhino Rocket in bleeding began to subside were able 

to clear her airway of the blood and the blood clots.  Once she was on a 

nonrebreather she is going at 100% pulse ox.  Listening to her lungs again 

after she calmed down she does have crackles noted in the lung fields 

anteriorly in the bases.


06/22/19 03:03


Her most recent hemoglobin that I can find was from October 2018 and it was 

12.5 and I spoke to Dr. Webster and he will admit to observation with further 

evaluation and treatment. Due to the possibility of aspiration of blood will 

start her on some Rocephin as well.


06/22/19 03:07


With the nonrebreather pulse ox remained 100% and we removed the nonrebreather 

to room air and she is now running in the low 90s for the pulse ox. She seems 

to be more responsive now and then she was.





Departure





- Departure


Time of Disposition: 03:06


Disposition: Refer to Observation


Condition: Poor


Clinical Impression: 


 Acute posterior epistaxis, Aspiration of blood, DNR (do not resuscitate)





Dementia


Qualifiers:


 Dementia type: unspecified type Dementia behavioral disturbance: without 

behavioral disturbance Qualified Code(s): F03.90 - Unspecified dementia without 

behavioral disturbance








- Discharge Information





ED Communication





- ED Communication Date/Time


Date: 06/22/19


Time Called: 03:07





- Discussed Case With (1)


Discussed Case With (1): Admitting Provider


Person/s Notified (1): Low Giles (He agrees to admit to observation)





- My Orders


Last 24 Hours: 


My Active Orders





06/22/19 02:06


Chest 1V Frontal [CR] Stat 














- Assessment/Plan


Last 24 Hours: 


My Active Orders





06/22/19 02:06


Chest 1V Frontal [CR] Stat

## 2019-06-22 NOTE — [id number]
**Discharge Summary





- Hospital Course


HPI Initial Comments: 


Yara Guadalupe is a 82 yo female who presented to ED in the very early 

morning hours via ambulance from [facility].  She reportedly had 

experienced a rather severe nosebleed last Monday and a Rhino Rocket was 

placed.  This was then removed at the residential on Wednesday and she began 

having bleeding again yesterday late evening.  A tampon was placed and they 

called the ambulance.  They report she been gurgling blood since the onset of 

the nosebleed in the tampon had fallen out prior to arrival in the ED.  She was 

noted to have gurgling sound blood in the back of her throat and complaining of 

difficulty breathing.  They suctioned her and place another 7.5 cm Rhino 

Rocket.  Code with cocaineand his legs until bleeding stops. There was able to 

remove blood clots back or throat she was able to breathe easier.  His noted at 

oxygen saturation 70s on arrival and with a nonrebreather she went back up into 

the 90s.





In the ED temperature is 97.4.  Pulse 79.  Respirations 20.  Blood pressure 1 

3466.  Pulse ox 71%.  Labs are obtained: WBC is normal at 9.71.  Hemoglobin 

10.8.  Hematocrit 33.1.  She is macrocytic.  Pulse are good at 222,000.  

Neutrophils are normal at 54.6%.  Sodium 143.  Potassium 3.5.  Chloride 106.  

Her minoxidil 27.  Anion gap 13.5.  BUN is high at 66.  Creatinine 1.8.  GFR is 

27.  Glucose 177.  Calcium 9.0.  AST is 14, ALT 15, alkaline phosphatase 72.  

Protein 7.1. Nonrebreather was discontinued and she was noted to be in the low 

90s for pulse ox. she is given Rocephin for concern over aspiration as noted.





She carries a history of: glaucoma, impaired vision, dry eye syndrome, blind 

right eye, HLD, HTN, edema, chronic constipation, GERD, dysphagia, renal disease

, hx/o hyperglycemic hyperosmolar coma, gout, Alzheimer's disease, dementia, 

schizophrenia, delirium, type II DM, hypothyroidism, obesity.  She was never a 

smoker.  She resides in the [facility]. She is a DNR. Her PCP is 

Dr. Webster.





Diagnosis: Stroke: No





- Discharge Data


Discharge Date: 06/22/19 (Admit date: 6/22/19)


Discharge Disposition: DC/Tfer to SNF [facility]


Condition: Stable





- Discharge Diagnosis/Problem(s)


(1) Acute posterior epistaxis


SNOMED Code(s): 927734673, 981338254


   ICD Code: R04.0 - EPISTAXIS   Status: Acute   Priority: High   Current Visit

: Yes   





(2) Aspiration of blood


SNOMED Code(s): 16979073


   ICD Code: R09.89 - OTH SYMPTOMS AND SIGNS INVOLVING THE CIRC AND RESP 

SYSTEMS   Status: Acute   Priority: High   Current Visit: Yes   





(3) DNR (do not resuscitate)


Status: Chronic   Priority: Medium   Current Visit: Yes   





(4) Dementia


SNOMED Code(s): 82366034


   ICD Code: F03.90 - UNSPECIFIED DEMENTIA WITHOUT BEHAVIORAL DISTURBANCE   

Status: Chronic   Priority: High   Current Visit: Yes   


Qualifiers: 


   Dementia type: unspecified type   Dementia behavioral disturbance: without 

behavioral disturbance   Qualified Code(s): F03.90 - Unspecified dementia 

without behavioral disturbance   





- Patient Summary/Data


Consults: 


 Consultations





06/22/19 08:42


Consult to Case Management/ [CONS] Routine 


Consult to Spiritual Care [CONS] Routine 


Respiratory Care Assess and Treatment [CONS] Routine 











Labs Pending at D/C: 


None





Recommended Follow-up Testing/Procedures: 


Follow-up with PCP within 5 days of discharge





May benefit from ENT outpatient visit should frequent epistaxis symptoms 

continue. 





Hospital Course: 


I/P:





Acute:





Epistaxis


   -Significant bleeding from right nostril noted in the ED.


   -Was seen past Monday in ED for similar symptoms. Rhino Rocket placed and 

removed Wednesday


   -Rhino Rocket replaced by ED provider


   -No obvious bleeding


   -Discussed with Dr. Webster. Recommends Rhino Rocket remain in place for 5 

days total


   -Hgb has remained stable


   


Possible Aspiration PNA


   -Noted to have dyspnea, oxygen saturations in the 70s, and significant blood 

in her throat.


   -CXR in ED shows possible aspiration in right lung.


   -Repeat CXR obtained today - Atelectasis and/or PNA in mid to lower lungs, 

Recommends repeat CXR in 4-6 weeks. 


   -No leukocytosis today.


   -No fever, Oxygen saturations in upper 90s on room air.


   -Rocephin given in ED


   -Will switch to 7 days 500mg BID Keflex with first dose 6/23/19


   -Significant dementia makes obtaining symptoms from patient difficult.





Chronic:


glaucoma


impaired vision


dry eye syndrome


blind right eye


HLD


HTN


edema


chronic constipation


GERD


dysphagia


renal disease


Hx/o hyperglycemic hyperosmolar coma


gout


Alzheimer's disease


dementia


schizophrenia


delirium


type II DM


hypothyroidism


obesity





Plan:


Admit to medical floor - observation status


Other orders as indicated above


Home medications as ordered


Routine AM labs


Code status: DNR; PCP: Dr. Maximilian Eric Yara did well. she never spiked a fever and her white count 

remained normal.  She is able to be successfully weaned off oxygen with 

saturations in the upper 90s.  Due to concerns over aspiration she was started 

on Rocephin 1gm in the ED. Discussed ABX choice with Dr. Webster and he would 

like one more 2gm dose of IV Rocephin before discharge.  This will be switched 

to 500 mg twice a day Keflex for 7 days on discharge.  Due to her continued 

bleeding her aspirin should be held until resumed by her primary care provider.

  We have also stopped the Aleve. Case was discussed with Dr. Webster who feels 

she is OK for discharge. He recommends Rhino Rocket remain in place for 5 days 

total and then be removed. She should return to the ED should symptoms worsen. 

She will be discharged today. She should follow-up with her PCP within 5 days 

of discharge, sooner if needed. 








- Patient Instructions


Diet: Usual Diet as Tolerated


Activity: As Tolerated


Driving: Do Not Drive


Notify Provider of: Fever, Increased Pain, Nausea and/or Vomiting





- Discharge Plan


*PRESCRIPTION DRUG MONITORING PROGRAM REVIEWED*: No


*COPY OF PRESCRIPTION DRUG MONITORING REPORT IN PATIENT JESSE: No


Prescriptions/Med Rec: 


Cephalexin [Keflex] 500 mg PO BID 7 Days #14 capsule


Home Medications: 


 Home Meds





Acetaminophen [Tylenol] 650 mg PO Q6H PRN 01/21/18 [History]


Levothyroxine 150 mcg PO DAILY 01/21/18 [History]


Memantine HCl [Namenda Xr] 7 mg PO DAILY 01/21/18 [History]


OLANZapine [ZyPREXA Zydis] 20 mg PO BEDTIME 01/21/18 [History]


Latanoprost [Xalatan 0.005% Ophth Soln] 1 drop EYEBOTH BEDTIME 01/22/18 [History

]


Polyethylene Glycol 3350 [MiraLAX] 17 gm PO Q2D  packet 01/22/18 [Rx]


Calcium Carbonate [Tums] 200 mg PO ASDIRECTED PRN 10/27/18 [History]


Citalopram Hydrobromide [Celexa] 10 mg PO BEDTIME 10/27/18 [History]


Dorzolamide/Timolol [Cosopt 2%-0.5% Ophth Soln] 1 drop EYEBOTH BID 10/27/18 [

History]


Melatonin 3 mg PO BEDTIME 10/27/18 [History]


OLANZapine [Olanzapine] 2.5 mg PO DAILY 10/27/18 [History]


Cholecalciferol (Vitamin D3) [Vitamin D3] 1,000 unit PO DAILY #30 tablet 10/30/

18 [Rx]


Furosemide [Lasix] 40 mg PO DAILY #0 10/30/18 [Rx]


Magnesium Hydroxide [Milk of Magnesia] 5 ml PO DAILY PRN 06/18/19 [History]


Trolamine Salicylate/Aloe Vera [Aspercreme 10%] 1 applic TOP BID PRN 06/18/19 [

History]


guaiFENesin [Mucinex] 600 mg PO BID PRN 06/18/19 [History]


Cephalexin [Keflex] 500 mg PO BID 7 Days #14 capsule 06/22/19 [Rx]








Oxygen Therapy Mode: Room Air





- Discharge Summary/Plan Comment


DC Time >30 min.: No





- General Info


Date of Service: 06/22/19


Admission Dx/Problem (Free Text: 


 Admission Diagnosis/Problem





Admission Diagnosis/Problem      Epistaxis








Subjective Update: 


In to see Yara with Dr. Webster.  She is doing quite well.  No obvious signs 

of bleeding.  No obvious signs of infection as she has not had a fever and her 

white count is normal.  CXR obtained this morning with repeat pending. she's 

been successfully weaned off of oxygen and his having saturations in the upper 

90s.  Nursing did speak with patient's daughter who is very hopeful she can be 

discharged today as historically Yara has had very difficult hospital stays 

with her mental status.  No other nursing concerns.





Functional Status: Reports: Pain Controlled, Tolerating Diet, Urinating.  Denies

: New Symptoms





- Review of Systems


Systems Review Comment: 


ROS unable to be reliably obtained due to patient's mental status.








- Patient Data


Vitals - Most Recent: 


 Last Vital Signs











Temp  97.3 F   06/22/19 07:57


 


Pulse  70   06/22/19 07:34


 


Resp  16   06/22/19 07:34


 


BP  145/85 H  06/22/19 07:41


 


Pulse Ox  98   06/22/19 07:34











Weight - Most Recent: 189 lb 8 oz


Lab Results - Last 24 hrs: 


 Laboratory Results - last 24 hr











  06/22/19 06/22/19 06/22/19 Range/Units





  01:46 01:46 05:20 


 


WBC  9.71   9.22  (3.98-10.04)  K/mm3


 


RBC  3.46 L   3.35 L  (3.98-5.22)  M/mm3


 


Hgb  10.8 L D   10.4 L  (11.2-15.7)  gm/L


 


Hct  33.1 L   31.9 L  (34.1-44.9)  %


 


MCV  95.7 H D   95.2 H  (79.4-94.8)  fl


 


MCH  31.2   31.0  (25.6-32.2)  pg


 


MCHC  32.6   32.6  (32.2-35.5)  g/dl


 


RDW Std Deviation  45.1   45.2  (36.4-46.3)  fL


 


Plt Count  222   200  (182-369)  K/mm3


 


MPV  11.3   11.2  (9.4-12.3)  fl


 


Neut % (Auto)  54.6   69.2  (34.0-71.1)  %


 


Lymph % (Auto)  35.2   23.9  (19.3-51.7)  %


 


Mono % (Auto)  7.2   5.7  (4.7-12.5)  %


 


Eos % (Auto)  2.6   1.0  (0.7-5.8)  


 


Baso % (Auto)  0.2   0.1  (0.1-1.2)  %


 


Neut # (Auto)  5.30   6.38 H  (1.56-6.13)  K/mm3


 


Lymph # (Auto)  3.42   2.20  (1.18-3.74)  K/mm3


 


Mono # (Auto)  0.70 H   0.53 H  (0.24-0.36)  K/mm3


 


Eos # (Auto)  0.25   0.09  (0.04-0.36)  K/mm3


 


Baso # (Auto)  0.02   0.01  (0.01-0.08)  K/mm3


 


Sodium   143   (136-145)  mEq/L


 


Potassium   3.5   (3.5-5.1)  mEq/L


 


Chloride   106   ()  mEq/L


 


Carbon Dioxide   27   (21-32)  mEq/L


 


Anion Gap   13.5   (5-15)  


 


BUN   66 H D   (7-18)  mg/dL


 


Creatinine   1.8 H   (0.55-1.02)  mg/dL


 


Est Cr Clr Drug Dosing   TNP   


 


Estimated GFR (MDRD)   27   (>60)  mL/min


 


BUN/Creatinine Ratio   36.7 H   (14-18)  


 


Glucose   177 H   ()  mg/dL


 


Calcium   9.0   (8.5-10.1)  mg/dL


 


Total Bilirubin   0.4   (0.2-1.0)  mg/dL


 


AST   14 L   (15-37)  U/L


 


ALT   15   (14-59)  U/L


 


Alkaline Phosphatase   72   ()  U/L


 


Total Protein   7.1   (6.4-8.2)  g/dl


 


Albumin   3.4   (3.4-5.0)  g/dl


 


Globulin   3.7   gm/dL


 


Albumin/Globulin Ratio   0.9 L   (1-2)  














  06/22/19 Range/Units





  05:20 


 


WBC   (3.98-10.04)  K/mm3


 


RBC   (3.98-5.22)  M/mm3


 


Hgb   (11.2-15.7)  gm/L


 


Hct   (34.1-44.9)  %


 


MCV   (79.4-94.8)  fl


 


MCH   (25.6-32.2)  pg


 


MCHC   (32.2-35.5)  g/dl


 


RDW Std Deviation   (36.4-46.3)  fL


 


Plt Count   (182-369)  K/mm3


 


MPV   (9.4-12.3)  fl


 


Neut % (Auto)   (34.0-71.1)  %


 


Lymph % (Auto)   (19.3-51.7)  %


 


Mono % (Auto)   (4.7-12.5)  %


 


Eos % (Auto)   (0.7-5.8)  


 


Baso % (Auto)   (0.1-1.2)  %


 


Neut # (Auto)   (1.56-6.13)  K/mm3


 


Lymph # (Auto)   (1.18-3.74)  K/mm3


 


Mono # (Auto)   (0.24-0.36)  K/mm3


 


Eos # (Auto)   (0.04-0.36)  K/mm3


 


Baso # (Auto)   (0.01-0.08)  K/mm3


 


Sodium  144  (136-145)  mEq/L


 


Potassium  3.7  (3.5-5.1)  mEq/L


 


Chloride  106  ()  mEq/L


 


Carbon Dioxide  26  (21-32)  mEq/L


 


Anion Gap  15.7 H  (5-15)  


 


BUN  67 H  (7-18)  mg/dL


 


Creatinine  1.6 H  (0.55-1.02)  mg/dL


 


Est Cr Clr Drug Dosing  23.81  


 


Estimated GFR (MDRD)  31  (>60)  mL/min


 


BUN/Creatinine Ratio  41.9 H  (14-18)  


 


Glucose  147 H  ()  mg/dL


 


Calcium  9.0  (8.5-10.1)  mg/dL


 


Total Bilirubin   (0.2-1.0)  mg/dL


 


AST   (15-37)  U/L


 


ALT   (14-59)  U/L


 


Alkaline Phosphatase   ()  U/L


 


Total Protein   (6.4-8.2)  g/dl


 


Albumin   (3.4-5.0)  g/dl


 


Globulin   gm/dL


 


Albumin/Globulin Ratio   (1-2)  











Med Orders - Current: 


 Current Medications





Acetaminophen (Tylenol)  650 mg PO Q4H PRN


   PRN Reason: Pain (Mild 1-3)/fever


Albuterol/Ipratropium (Duoneb 3.0-0.5 Mg/3 Ml)  3 ml NEB Q4H PRN


   PRN Reason: Shortness Of Breath/wheezing


Citalopram Hydrobromide (Celexa)  10 mg PO BEDTIME HEIDY


Dorzolamide/Timolol (Cosopt 2%-0.5% Ophth Soln)   ml EYEBOTH BID HEIDY


Guaifenesin (Mucinex)  600 mg PO BID PRN


   PRN Reason: Congestion


Sodium Chloride (Normal Saline)  1,000 mls @ 50 mls/hr IV ASDIRECTED HEIDY


Latanoprost (Xalatan 0.005% Ophth Soln)   ml EYEBOTH BEDTIME HEIDY


Levothyroxine Sodium (Levothyroxine)  150 mcg PO DAILY HEIDY


Magnesium Hydroxide (Milk Of Magnesia)  5 ml PO DAILY PRN


   PRN Reason: Constipation


Melatonin (Melatonin)  3 mg PO BEDTIME HEIDY


Non-Formulary Medication (Memantine Hcl [Namenda Xr])  7 mg PO DAILY HEIDY


Non-Formulary Medication (Olanzapine [Zyprexa Zydis])  20 mg PO BEDTIME HEIDY


Non-Formulary Medication (Trolamine Salicylate/Aloe Vera)  1 applic TOP BID PRN


   PRN Reason: Pain


Olanzapine (Zyprexa)  2.5 mg PO DAILY HEIDY


Polyethylene Glycol (Miralax)  17 gm PO Q2D HEIDY





Discontinued Medications





Cocaine HCl (Cocaine Hcl)  4 ml TOP ONETIME ONE


   Stop: 06/22/19 03:18


   Last Admin: 06/22/19 03:29 Dose:  4 ml


Sodium Chloride (Normal Saline)  1,000 mls @ 100 mls/hr IV ASDIRECTED HEIDY


   Last Admin: 06/22/19 03:13 Dose:  100 mls/hr


Ceftriaxone Sodium 1 gm/ (Sodium Chloride)  100 mls @ 200 mls/hr IV ONETIME ONE


   Stop: 06/22/19 03:33


   Last Admin: 06/22/19 03:15 Dose:  200 mls/hr


Sodium Chloride (Normal Saline)  1,000 mls @ 100 mls/hr IV ASDIRECTED HEIDY











- Exam


Quality Assessment: Reports: DVT Prophylaxis


General: Reports: Alert, Cooperative (Somewhat ), No Acute Distress.  Denies: 

Oriented


HEENT: Reports: Pupils Equal, Pupils Reactive, Mucous Membr. Moist/Pink


Neck: Reports: Supple


Lungs: Reports: Normal Respiratory Effort, Decreased Breath Sounds, Crackles (

Mild right ).  Denies: Rhonchi, Wheezing


Cardiovascular: Reports: Regular Rate, Regular Rhythm


GI/Abdominal Exam: Normal Bowel Sounds, Soft, Non-Tender, No Distention, No 

Abnormal Bruit, Pelvis Stable


 (Female) Exam: Deferred


Rectal (Female) Exam: Deferred


Back Exam: Reports: Normal Inspection, Full Range of Motion


Extremities: Normal Inspection


Skin: Reports: Warm, Dry, Intact


Neurological: Reports: No New Focal Deficit


Psy/Mental Status: Reports: Alert

## 2019-06-22 NOTE — PCM.HP
H&P History of Present Illness





- General


Date of Service: 06/22/19


Admit Problem/Dx: 


 Admission Diagnosis/Problem





Admission Diagnosis/Problem      Epistaxis








Source of Information: Patient, Provider, RN, RN Notes Reviewed


History Limitations: Reports: No Limitations





- History of Present Illness


Initial Comments - Free Text/Narative: 


Yara Guadalupe is a 82 yo female who presented to ED in the very early 

morning hours via ambulance from [facility].  She reportedly had 

experienced a rather severe nosebleed last Monday and a Rhino Rocket was 

placed.  This was then removed at the [facility] on Wednesday and she began 

having bleeding again yesterday late evening.  A tampon was placed and they 

called the ambulance.  They report she been gurgling blood since the onset of 

the nosebleed in the tampon had fallen out prior to arrival in the ED.  She was 

noted to have gurgling sound blood in the back of her throat and complaining of 

difficulty breathing.  They suctioned her and place another 7.5 cm Rhino 

Rocket.  Code with cocaineand his legs until bleeding stops. There was able to 

remove blood clots back or throat she was able to breathe easier.  His noted at 

oxygen saturation 70s on arrival and with a nonrebreather she went back up into 

the 90s.





In the ED temperature is 97.4.  Pulse 79.  Respirations 20.  Blood pressure 1 

3466.  Pulse ox 71%.  Labs are obtained: WBC is normal at 9.71.  Hemoglobin 

10.8.  Hematocrit 33.1.  She is macrocytic.  Pulse are good at 222,000.  

Neutrophils are normal at 54.6%.  Sodium 143.  Potassium 3.5.  Chloride 106.  

Her minoxidil 27.  Anion gap 13.5.  BUN is high at 66.  Creatinine 1.8.  GFR is 

27.  Glucose 177.  Calcium 9.0.  AST is 14, ALT 15, alkaline phosphatase 72.  

Protein 7.1. Nonrebreather was discontinued and she was noted to be in the low 

90s for pulse ox. she is given Rocephin for concern over aspiration as noted.





She carries a history of: glaucoma, impaired vision, dry syndrome, blind right 

eye, HLD, HTN, edema, chronic constipation, GERD, dysphagia, renal disease, 

hyperglycemic hyperosmolar coma, gout, Alzheimer's disease,dementia, 

schizophrenia, delirium, type II DM, hypothyroidism, obesity.  She was never a 

smoker.  She resides in the [facility]. She is a DNR. Her PCP is 

Dr. Webster.








- Related Data


Allergies/Adverse Reactions: 


 Allergies











Allergy/AdvReac Type Severity Reaction Status Date / Time


 


atorvastatin Allergy  Other Verified 06/22/19 01:36


 


codeine Allergy  Other Verified 06/22/19 01:36


 


losartan Allergy  Other Verified 06/22/19 01:36


 


morphine Allergy  Other Verified 06/22/19 01:36


 


Penicillins Allergy  Other Verified 06/22/19 01:36











Home Medications: 


 Home Meds





Acetaminophen [Tylenol] 650 mg PO Q6H PRN 01/21/18 [History]


Levothyroxine 150 mcg PO DAILY 01/21/18 [History]


Memantine HCl [Namenda Xr] 7 mg PO DAILY 01/21/18 [History]


OLANZapine [ZyPREXA Zydis] 20 mg PO BEDTIME 01/21/18 [History]


Latanoprost [Xalatan 0.005% Ophth Soln] 1 drop EYEBOTH BEDTIME 01/22/18 [History

]


Polyethylene Glycol 3350 [MiraLAX] 17 gm PO Q2D  packet 01/22/18 [Rx]


Calcium Carbonate [Tums] 200 mg PO ASDIRECTED PRN 10/27/18 [History]


Citalopram Hydrobromide [Celexa] 10 mg PO BEDTIME 10/27/18 [History]


Dorzolamide/Timolol [Cosopt 2%-0.5% Ophth Soln] 1 drop EYEBOTH BID 10/27/18 [

History]


Melatonin 3 mg PO BEDTIME 10/27/18 [History]


OLANZapine [Olanzapine] 2.5 mg PO DAILY 10/27/18 [History]


Cholecalciferol (Vitamin D3) [Vitamin D3] 1,000 unit PO DAILY #30 tablet 10/30/

18 [Rx]


Furosemide [Lasix] 40 mg PO DAILY #0 10/30/18 [Rx]


Magnesium Hydroxide [Milk of Magnesia] 5 ml PO DAILY PRN 06/18/19 [History]


Trolamine Salicylate/Aloe Vera [Aspercreme 10%] 1 applic TOP BID PRN 06/18/19 [

History]


guaiFENesin [Mucinex] 600 mg PO BID PRN 06/18/19 [History]


Cephalexin [Keflex] 500 mg PO BID 7 Days #14 capsule 06/22/19 [Rx]











Past Medical History


HEENT History: Reports: Epistaxis, Glaucoma, Impaired Vision, Other (See Below)


Other HEENT History: dry eyes syndrome, wears eyeglasses, left eye corneal ulcer


Cardiovascular History: Reports: High Cholesterol, Hypertension


Other Cardiovascular History: edema


Respiratory History: Reports: None


Gastrointestinal History: Reports: Chronic Constipation, GERD


Other Gastrointestinal History: dysphagia


Genitourinary History: Reports: Renal Disease


Other Genitourinary History: hx hyperglycemic--hyperosmolar coma ([facility])


OB/GYN History: Reports: Pregnancy


Musculoskeletal History: Reports: Gout


Other Musculoskeletal History: uses walker


Neurological History: Reports: Alzheimers Disease, Other (See Below)


Other Neuro History: encephalopathy, AMS


Psychiatric History: Reports: Alzheimers Disease, Dementia, Depression, 

Schizophrenia, Other (See Below)


Other Psychiatric History: delirium


Endocrine/Metabolic History: Reports: Diabetes, Type II, Hypothyroidism, Obesity

/BMI 30+


Other Endocrine/Metabolic History: hx hyperglycemic--hyperosmolar coma ([facility])


Hematologic History: Reports: Other (See Below)


Other Hematologic History: vitamin D deficiency


Immunologic History: Reports: None


Oncologic (Cancer) History: Reports: None


Dermatologic History: Reports: Other (See Below)


Other Dermatologic History: xerosis cutis, corns and callosities





- Infectious Disease History


Infectious Disease History: Reports: Other (See Below)


Other Infectious Disease History: unknown-patient confused and family not 

present.





- Past Surgical History


Head Surgeries/Procedures: Reports: None


HEENT Surgical History: Reports: Other (See Below)


Other HEENT Surgeries/Procedures: unknown


Cardiovascular Surgical History: Reports: Other (See Below)


Other Cardiovascular Surgeries/Procedures: unknown


GI Surgical History: Reports: Other (See Below)


Other GI Surgeries/Procedures: unknown


Female  Surgical History: Reports: Other (See Below)


Other Female  Surgeries/Procedures: unknown


Endocrine Surgical History: Reports: Other (See Below)


Other Endocrine Surgeries/Procedures: unknown


Neurological Surgical History: Reports: None





Social & Family History





- Family History


Family Medical History: Noncontributory





- Tobacco Use


Smoking Status *Q: Unknown Ever Smoked


Second Hand Smoke Exposure: No





- Caffeine Use


Caffeine Use: Reports: Other


Caffeine Use Comment: unknown-patient confused





- Recreational Drug Use


Recreational Drug Use: No





- Living Situation & Occupation


Living situation: Reports: Extended Care Facility (Currently living in the 

[facility])


Occupation: Retired





H&P Review of Systems





- Review of Systems:


Review Of Systems: Unable To Obtain


Review of Systems Comment:: 


unable to obtain review of systems as patient is very confused and accurate ROS 

cannot be reliably obtained.








Exam





- Exam


Exam: See Below





- Vital Signs


Vital Signs: 


 Last Vital Signs











Temp  97.3 F   06/22/19 07:57


 


Pulse  70   06/22/19 07:34


 


Resp  16   06/22/19 07:34


 


BP  145/85 H  06/22/19 07:41


 


Pulse Ox  98   06/22/19 07:34











Weight: 189 lb 8 oz





- Exam


Quality Assessment: DVT Prophylaxis


General: Alert, Cooperative (somewhat  ).  No: Oriented


HEENT: Conjunctiva Clear, EACs Clear, Hearing Intact, Mucosa Moist & Pink, 

Other (Rhino Rocket in place in right nare. No active bleeding noted. )


Neck: Supple, Trachea Midline


Lungs: Normal Respiratory Effort, Crackles (mild right).  No: Rhonchi, Wheezing


Cardiovascular: Regular Rate, Regular Rhythm


GI/Abdominal Exam: Normal Bowel Sounds, Soft, Non-Tender, No Distention, No 

Abnormal Bruit


 (Female) Exam: Deferred


Rectal (Female) Exam: Deferred


Back Exam: Normal Inspection, Full Range of Motion


Extremities: Normal Inspection


Skin: Warm, Dry, Intact


Neuro Extensive - Mental Status: Alert, Other (Very confused. Difficult to 

redirect. )





- Patient Data


Lab Results Last 24 hrs: 


 Laboratory Results - last 24 hr











  06/22/19 06/22/19 06/22/19 Range/Units





  01:46 01:46 05:20 


 


WBC  9.71   9.22  (3.98-10.04)  K/mm3


 


RBC  3.46 L   3.35 L  (3.98-5.22)  M/mm3


 


Hgb  10.8 L D   10.4 L  (11.2-15.7)  gm/L


 


Hct  33.1 L   31.9 L  (34.1-44.9)  %


 


MCV  95.7 H D   95.2 H  (79.4-94.8)  fl


 


MCH  31.2   31.0  (25.6-32.2)  pg


 


MCHC  32.6   32.6  (32.2-35.5)  g/dl


 


RDW Std Deviation  45.1   45.2  (36.4-46.3)  fL


 


Plt Count  222   200  (182-369)  K/mm3


 


MPV  11.3   11.2  (9.4-12.3)  fl


 


Neut % (Auto)  54.6   69.2  (34.0-71.1)  %


 


Lymph % (Auto)  35.2   23.9  (19.3-51.7)  %


 


Mono % (Auto)  7.2   5.7  (4.7-12.5)  %


 


Eos % (Auto)  2.6   1.0  (0.7-5.8)  


 


Baso % (Auto)  0.2   0.1  (0.1-1.2)  %


 


Neut # (Auto)  5.30   6.38 H  (1.56-6.13)  K/mm3


 


Lymph # (Auto)  3.42   2.20  (1.18-3.74)  K/mm3


 


Mono # (Auto)  0.70 H   0.53 H  (0.24-0.36)  K/mm3


 


Eos # (Auto)  0.25   0.09  (0.04-0.36)  K/mm3


 


Baso # (Auto)  0.02   0.01  (0.01-0.08)  K/mm3


 


Sodium   143   (136-145)  mEq/L


 


Potassium   3.5   (3.5-5.1)  mEq/L


 


Chloride   106   ()  mEq/L


 


Carbon Dioxide   27   (21-32)  mEq/L


 


Anion Gap   13.5   (5-15)  


 


BUN   66 H D   (7-18)  mg/dL


 


Creatinine   1.8 H   (0.55-1.02)  mg/dL


 


Est Cr Clr Drug Dosing   TNP   


 


Estimated GFR (MDRD)   27   (>60)  mL/min


 


BUN/Creatinine Ratio   36.7 H   (14-18)  


 


Glucose   177 H   ()  mg/dL


 


Calcium   9.0   (8.5-10.1)  mg/dL


 


Total Bilirubin   0.4   (0.2-1.0)  mg/dL


 


AST   14 L   (15-37)  U/L


 


ALT   15   (14-59)  U/L


 


Alkaline Phosphatase   72   ()  U/L


 


Total Protein   7.1   (6.4-8.2)  g/dl


 


Albumin   3.4   (3.4-5.0)  g/dl


 


Globulin   3.7   gm/dL


 


Albumin/Globulin Ratio   0.9 L   (1-2)  














  06/22/19 Range/Units





  05:20 


 


WBC   (3.98-10.04)  K/mm3


 


RBC   (3.98-5.22)  M/mm3


 


Hgb   (11.2-15.7)  gm/L


 


Hct   (34.1-44.9)  %


 


MCV   (79.4-94.8)  fl


 


MCH   (25.6-32.2)  pg


 


MCHC   (32.2-35.5)  g/dl


 


RDW Std Deviation   (36.4-46.3)  fL


 


Plt Count   (182-369)  K/mm3


 


MPV   (9.4-12.3)  fl


 


Neut % (Auto)   (34.0-71.1)  %


 


Lymph % (Auto)   (19.3-51.7)  %


 


Mono % (Auto)   (4.7-12.5)  %


 


Eos % (Auto)   (0.7-5.8)  


 


Baso % (Auto)   (0.1-1.2)  %


 


Neut # (Auto)   (1.56-6.13)  K/mm3


 


Lymph # (Auto)   (1.18-3.74)  K/mm3


 


Mono # (Auto)   (0.24-0.36)  K/mm3


 


Eos # (Auto)   (0.04-0.36)  K/mm3


 


Baso # (Auto)   (0.01-0.08)  K/mm3


 


Sodium  144  (136-145)  mEq/L


 


Potassium  3.7  (3.5-5.1)  mEq/L


 


Chloride  106  ()  mEq/L


 


Carbon Dioxide  26  (21-32)  mEq/L


 


Anion Gap  15.7 H  (5-15)  


 


BUN  67 H  (7-18)  mg/dL


 


Creatinine  1.6 H  (0.55-1.02)  mg/dL


 


Est Cr Clr Drug Dosing  23.81  


 


Estimated GFR (MDRD)  31  (>60)  mL/min


 


BUN/Creatinine Ratio  41.9 H  (14-18)  


 


Glucose  147 H  ()  mg/dL


 


Calcium  9.0  (8.5-10.1)  mg/dL


 


Total Bilirubin   (0.2-1.0)  mg/dL


 


AST   (15-37)  U/L


 


ALT   (14-59)  U/L


 


Alkaline Phosphatase   ()  U/L


 


Total Protein   (6.4-8.2)  g/dl


 


Albumin   (3.4-5.0)  g/dl


 


Globulin   gm/dL


 


Albumin/Globulin Ratio   (1-2)  











Result Diagrams: 


 06/22/19 05:20





 06/22/19 05:20





- Problem List


(1) Acute posterior epistaxis


SNOMED Code(s): 179831314, 415684347


   ICD Code: R04.0 - EPISTAXIS   Status: Acute   Priority: High   Current Visit

: Yes   





(2) Aspiration of blood


SNOMED Code(s): 67813097


   ICD Code: R09.89 - OTH SYMPTOMS AND SIGNS INVOLVING THE CIRC AND RESP 

SYSTEMS   Status: Acute   Priority: High   Current Visit: Yes   





(3) DNR (do not resuscitate)


Status: Chronic   Priority: Medium   Current Visit: Yes   





(4) Dementia


SNOMED Code(s): 64714836


   ICD Code: F03.90 - UNSPECIFIED DEMENTIA WITHOUT BEHAVIORAL DISTURBANCE   

Status: Chronic   Priority: High   Current Visit: Yes   


Qualifiers: 


   Dementia type: unspecified type   Dementia behavioral disturbance: without 

behavioral disturbance   Qualified Code(s): F03.90 - Unspecified dementia 

without behavioral disturbance   


Problem List Initiated/Reviewed/Updated: Yes


Orders Last 24hrs: 


 Active Orders 24 hr











 Category Date Time Status


 


 Patient Status [ADT] Routine ADT  06/22/19 03:41 Active


 


 Up With Assistance [RC] 10,22 Care  06/22/19 04:57 Active


 


 Clear Liquid Diet [DIET] Diet  06/22/19 Breakfast Active


 


 CXR [Chest 2V] [CR] Routine Exams  06/22/19 08:00 Ordered


 


 Chest 1V Frontal [CR] Stat Exams  06/22/19 02:06 Taken


 


 METH-RESIST S.AUR,MRSA BY PCR [MOLEC] Routine Lab  06/22/19 04:15 Received


 


 Sodium Chloride 0.9% [Normal Saline] 1,000 ml Med  06/22/19 08:45 Ordered





 IV ASDIRECTED   


 


 Code Status [Resuscitation Status] Routine Resus Stat  06/22/19 04:55 Ordered








 Medication Orders





Sodium Chloride (Normal Saline)  1,000 mls @ 50 mls/hr IV ASDIRECTED HEIDY








Assessment/Plan Comment:: 


I/P:





Acute:





Epistaxis


   -Significant bleeding from right nare noted in the ED.


   -Was seen past Monday in ED for similar symptoms. Rhino Rocket placed and 

removed Wednesday


   -Rhino Rocket replaced by ED provider


   -No obvious bleeding


   -Discussed with Dr. Webster. Recommends Rhino Rocket remain in place for 5 

days total


   -Hgb has remained stable


   


Possible Aspiration PNA


   -Noted to have dyspnea, oxygen saturations in the 70s, and significant blood 

in her throat.


   -CXR in ED shows possible aspiration in right lung.


   -Repeat CXR obtained today - pending formal read.


   -No leukocytosis today.


   -No fever, Oxygen saturations in upper 90s on room air.


   -Rocephin given in ED


   -Will switch to 7 days 500mg BID Keflex with first dose 6/23/19


   -Significant dementia makes obtaining symptoms from patient difficult.





Chronic:


glaucoma


impaired vision


dry eye syndrome


blind right eye


HLD


HTN


edema


chronic constipation


GERD


dysphagia


renal disease


Hx/o hyperglycemic hyperosmolar coma


gout


Alzheimer's disease


dementia


schizophrenia


delirium


type II DM


hypothyroidism


obesity





Plan:


Admit to medical floor - observation status


Other orders as indicated above


Home medications as ordered


Routine AM labs


Code status: DNR; PCP: Dr. Webster

## 2019-06-24 NOTE — CR
Chest: Two views of the chest are obtained.

 

Comparison: Prior chest x-ray performed earlier on same date (2:26 AM)

 

Findings: Increased central lung markings are seen.  Findings are 

slightly less prominent than seen on prior exam most likely relating 

to differences in technique.  Difficult to exclude mild bronchitis 

superimposed upon chronic change.  Lungs otherwise are clear and no 

acute alveolar parenchymal change.  Heart size is within normal 

limits.  Tortuous thoracic aorta is seen.  Mild degenerative change is

 noted within the spine.  Small hiatal hernia is noted.

 

Impression:

1.  Stable findings as noted above.

 

Diagnostic code #3

 

I agree with preliminary report from ad, finalized on 06/22/19, 

11:55 AM Central Time

## 2019-06-24 NOTE — CR
Chest: Frontal view of the chest was obtained utilizing portable 

technique.

 

Comparison: Prior chest CT of 10/27/18 and chest x-ray of 01/21/18.

 

Lung markings are increased most of which appear chronic but difficult

 to exclude mild superimposed bronchitis.  Lungs otherwise are clear 

with no alveolar type densities.  Heart size is normal.  Tortuous 

thoracic aorta is seen.  Bony structures are grossly intact.

 

Impression:

1.  Increased central lung markings most of which appear to be 

chronic.  Difficult to exclude mild superimposed bronchitis.

2.  Other incidental findings.

 

Diagnostic code #3

## 2022-03-09 NOTE — EDM.PDOC
[facility] EMERGENCY CTR  [address]  [address]  [phone]    Work/School Note    Date: 3/9/2022    To Whom It May concern:      Vanessa Hagan was seen and treated today in the emergency room by the following provider(s):  Attending Provider: Dc Go MD.      Vanessa Hagan is excused from work/school on 03/09/22. She is clear to return to work/school on 03/10/22.         Sincerely,          Cierra Wray MD ED HPI GENERAL MEDICAL PROBLEM





- General


Chief Complaint: Genitourinary Problem


Stated Complaint: NOT URINATING


Time Seen by Provider: 07/30/17 12:26


Source of Information: Reports: Patient, Family


History Limitations: Reports: Altered Mental Status (Dementia)





- History of Present Illness


INITIAL COMMENTS - FREE TEXT/NARRATIVE: 


Patient is a 81-year-old female with history of dementia that resides at the 

[facility] presents the ED with daughter with concerns of 

increased confusion. Daughter states patient over the past week has been 

experiencing intermittent low-grade fever. She is a little less steady with 

being on her feet but continues to ambulate with a walker, she is refusing to 

take medications, temper has increased, has been refusing to eat eat and drink, 

and she's had decrease in urine output. She's also complained of intermittent 

pain to her lower back that is new. Daughter states they have been modifying 

her antipsychotic medications this past week. Unclear which medication it is at 

this time. Patient has not had any recent falls.





Past history includes: Glaucoma, hypercholesteremia, hypertension, chronic 

constipation, GERD, renal disease, gout, Alzheimer's disease, encephalopathy, 

schizophrenia, delirium, type 2 diabetes, hypothyroid 





Current medications include Tylenol, levothyroxine, metoprolol, olanzapine, 

metformin.  See list for additional


  ** lower back


Pain Score (Numeric/FACES): 5





- Related Data


 Allergies











Allergy/AdvReac Type Severity Reaction Status Date / Time


 


atorvastatin Allergy  Other Verified 07/30/17 12:25


 


codeine Allergy  Other Verified 07/30/17 12:25


 


losartan Allergy  Other Verified 07/30/17 12:25


 


morphine Allergy  Other Verified 07/30/17 12:25


 


Penicillins Allergy  Other Verified 07/30/17 12:25











Home Meds: 


 Home Meds





Acetaminophen [Tylenol] 325 mg PO Q4H PRN 07/30/17 [History]


Betamethasone/Clotrimazole [Lotrisone] 1 applic TOP BID PRN 07/30/17 [History]


Bisacodyl [Dulcolax] 10 mg RECTAL DAILY PRN 07/30/17 [History]


Brimonidine Tartrate [Alphagan P] 1 drop EYELF BID 07/30/17 [History]


Calcium Carbonate [Tums] 200 mg PO ASDIRECTED PRN 07/30/17 [History]


Colesevelam HCl [Welchol] 625 mg PO BID 07/30/17 [History]


Compression Socks, Medium [Futuro Restoring] 1 applic TOP ASDIRECTED 07/30/17 [

History]


Dorzolamide [Trusopt 2% Ophth Soln] 10 ml EYELF BID 07/30/17 [History]


Edetate Disodium/PEG [Eyescrub Cleansing Pads] 1 each TP DAILY 07/30/17 [History

]


Glycerin/Propylene Glycol [Artificial Tears Drops] 1 drop OP QID 07/30/17 [

History]


Latanoprost [Xalatan] 1 drop EYELF BEDTIME 07/30/17 [History]


Levothyroxine 137 mcg PO ACBREAKFAST 07/30/17 [History]


Metoprolol Tartrate 75 mg PO BID 07/30/17 [History]


OLANZapine [Olanzapine] 7.5 mg PO DAILY 07/30/17 [History]


Polyethylene Glycol 3350 [MiraLAX] 17 gm PO DAILY 07/30/17 [History]


Sennosides [Senna] 8.6 mg PO DAILY PRN 07/30/17 [History]


Timolol Maleate [Timoptic 0.25% Ophth Soln] 5 ml EYELF BID 07/30/17 [History]


guaiFENesin/Dextromethorphan [Tussin DM Cough & Chest] 5 ml PO Q4H PRN 07/30/17 

[History]


metFORMIN [Glucophage XR] 500 mg PO BIDMEALS 07/30/17 [History]











Past Medical History


HEENT History: Reports: Glaucoma, Other (See Below)


Other HEENT History: dry eyes syndrome


Cardiovascular History: Reports: High Cholesterol, Hypertension


Gastrointestinal History: Reports: Chronic Constipation, GERD


Genitourinary History: Reports: Renal Disease, Other (See Below)


Other Genitourinary History: hx hyperglycemic--hyperosmolar coma ([facility])


Musculoskeletal History: Reports: Gout


Other Musculoskeletal History: uses walker


Neurological History: Reports: Alzheimers Disease, Other (See Below)


Other Neuro History: encephalopathy, AMS


Psychiatric History: Reports: Alzheimers Disease, Schizophrenia, Other (See 

Below)


Other Psychiatric History: delirium


Endocrine/Metabolic History: Reports: Diabetes, Type II, Hypothyroidism, Obesity

/BMI 30+





- Past Surgical History


Other Endocrine Surgeries/Procedures: thyroid surgery





Social & Family History





- Family History


Family Medical History: Noncontributory





- Tobacco Use


Smoking Status *Q: Never Smoker


Second Hand Smoke Exposure: No





- Caffeine Use


Caffeine Use: Reports: Coffee





- Recreational Drug Use


Recreational Drug Use: No





ED ROS GENERAL





- Review of Systems


Review Of Systems: See Below


Constitutional: Reports: Fever (low grade 99.5 for one wk. ), Malaise, Weakness

, Fatigue, Decreased Appetite.  Denies: Chills


HEENT: Reports: No Symptoms


Respiratory: Reports: No Symptoms


Cardiovascular: Reports: No Symptoms


GI/Abdominal: Reports: No Symptoms


: Reports: No Symptoms


Musculoskeletal: Reports: Back Pain (Low back)


Skin: Reports: No Symptoms


Neurological: Reports: Confusion (History of dementia, recently increased), 

Difficulty Walking (Able to ambulate with walker with no assistance. Patient 

appears to be more unsteady on her feet.).  Denies: Headache


Psychiatric: Reports: Confusion





- Physical Exam


Exam: See Below


Exam Limited By: Altered Mental Status (Dementia)


General Appearance: Alert, WD/WN, No Apparent Distress, Other (Falls asleep 

easily)


Eye Exam: Bilateral Eye: EOMI, Nystagmus (None found), PERRL


Ears: Hearing Grossly Normal


Nose: Normal Inspection


Throat/Mouth: Normal Voice, No Airway Compromise, Other (Dry oromucosa, no 

uvula deviation, no tongue deviation)


Head Exam: Atraumatic, Normocephalic


Neck: Normal Inspection, Supple, Non-Tender.  No: Lymphadenopathy (L), 

Lymphadenopathy (R)


Respiratory/Chest: No Respiratory Distress, Lungs Clear, Normal Breath Sounds, 

No Accessory Muscle Use, Chest Non-Tender


Cardiovascular: Normal Peripheral Pulses, No JVD, Bradycardia, Systolic Murmur (

Faint)


GI/Abdominal: Normal Bowel Sounds, Soft, Non-Tender, No Organomegaly, No 

Distention


Neuro Exam (Abbreviated): Alert, Oriented (To person and family), CN II-XII 

Intact, No Motor/Sensory Deficits, Confused, Other (No weakness discrepancy 2 

upper and lower extremities. No facial droop, slurred speech, pronator drift. 

Cerebellar function intact including: Finger-nose and rapid alternating 

movements. While laying in bed patient list to the right. This is normal for 

the patient. )


Back Exam: Normal Inspection, Full Range of Motion.  No: CVA Tenderness (L), 

CVA Tenderness (R), Paraspinal Tenderness, Vertebral Tenderness


Extremities: Normal Inspection, Non-Tender, No Pedal Edema, Normal Capillary 

Refill


Psychiatric: Normal Affect, Normal Mood


Skin Exam: Warm, Dry, Intact, Normal Color, No Rash





Course





- Vital Signs


Last Recorded V/S: 


 Last Vital Signs











Temp  97.1 F   07/30/17 12:10


 


Pulse  58 L  07/30/17 16:16


 


Resp  20   07/30/17 16:16


 


BP  170/86 H  07/30/17 16:16


 


Pulse Ox  99   07/30/17 16:16














- Orders/Labs/Meds


Orders: 


 Active Orders 24 hr











 Category Date Time Status


 


 EKG Documentation Completion [RC] STAT Care  07/30/17 12:47 Active


 


 Insert Pike Catheter [Insert Urinary Catheter] [OM.PC] Care  07/30/17 14:35 

Ordered





 Q24H   


 


 Peripheral IV Care [RC] .AS DIRECTED Care  07/30/17 12:48 Active


 


 Urinary Catheter Assessment [RC] ASDIRECTED Care  07/30/17 14:35 Active


 


 Chest 2V [CR] Stat Exams  07/30/17 12:47 Taken


 


 CULTURE BLOOD [BC] Stat Lab  07/30/17 13:25 Received


 


 CULTURE BLOOD [BC] Stat Lab  07/30/17 13:35 Received


 


 Blood Culture x2 Reflex Set [OM.PC] Stat Oth  07/30/17 12:55 Ordered


 


 Peripheral IV Insertion Adult [OM.PC] Stat Oth  07/30/17 12:48 Ordered











Labs: 


 Laboratory Tests











  07/30/17 07/30/17 07/30/17 Range/Units





  13:00 13:00 13:00 


 


WBC  8.91    (3.98-10.04)  K/mm3


 


RBC  4.13    (3.98-5.22)  M/mm3


 


Hgb  12.7    (11.2-15.7)  gm/L


 


Hct  37.9    (34.1-44.9)  %


 


MCV  91.8    (79.4-94.8)  fl


 


MCH  30.8    (25.6-32.2)  pg


 


MCHC  33.5    (32.2-35.5)  g/dl


 


RDW Std Deviation  45.4    (36.4-46.3)  fL


 


Plt Count  190    (182-369)  K/mm3


 


MPV  10.9    (9.4-12.3)  fl


 


Neut % (Auto)  50.1    (34.0-71.1)  %


 


Lymph % (Auto)  40.6    (19.3-51.7)  %


 


Mono % (Auto)  8.3    (4.7-12.5)  %


 


Eos % (Auto)  0.7    (0.7-5.8)  


 


Baso % (Auto)  0.1    (0.1-1.2)  %


 


Neut # (Auto)  4.46    (1.56-6.13)  K/mm3


 


Lymph # (Auto)  3.62    (1.18-3.74)  K/mm3


 


Mono # (Auto)  0.74 H    (0.24-0.36)  K/mm3


 


Eos # (Auto)  0.06    (0.04-0.36)  K/mm3


 


Baso # (Auto)  0.01    (0.01-0.08)  K/mm3


 


Sodium   142   (136-145)  mEq/L


 


Potassium   4.3   (3.5-5.1)  mEq/L


 


Chloride   107   ()  mEq/L


 


Carbon Dioxide   27   (21-32)  mEq/L


 


Anion Gap   12.3   (5-15)  


 


BUN   29 H   (7-18)  mg/dL


 


Creatinine   1.6 H   (0.55-1.02)  mg/dL


 


Est Cr Clr Drug Dosing   24.62   mL/min


 


Estimated GFR (MDRD)   31   (>60)  mL/min


 


BUN/Creatinine Ratio   18.1 H   (14-18)  


 


Glucose   91   ()  mg/dL


 


Calcium   9.9   (8.5-10.1)  mg/dL


 


Total Bilirubin   0.6   (0.2-1.0)  mg/dL


 


AST   23   (15-37)  U/L


 


ALT   20   (14-59)  U/L


 


Alkaline Phosphatase   35 L   ()  U/L


 


Troponin I    < 0.017  (0.00-0.056)  ng/mL


 


C-Reactive Protein   < 0.2   (<1.0)  mg/dL


 


Total Protein   7.0   (6.4-8.2)  g/dl


 


Albumin   3.6   (3.4-5.0)  g/dl


 


Globulin   3.4   gm/dL


 


Albumin/Globulin Ratio   1.1   (1-2)  


 


TSH 3rd Generation   5.201 H   (0.358-3.74)  uIU/mL


 


Urine Color     (Yellow)  


 


Urine Appearance     (Clear)  


 


Urine pH     (5.0-8.0)  


 


Ur Specific Gravity     (1.005-1.030)  


 


Urine Protein     (Negative)  


 


Urine Glucose (UA)     (Negative)  


 


Urine Ketones     (Negative)  


 


Urine Occult Blood     (Negative)  


 


Urine Nitrite     (Negative)  


 


Urine Bilirubin     (Negative)  


 


Urine Urobilinogen     (0.2-1.0)  


 


Ur Leukocyte Esterase     (Negative)  


 


Urine RBC     (0-5)  /hpf


 


Urine WBC     (0-5)  /hpf


 


Ur Epithelial Cells     (0-5)  /hpf


 


Urine Bacteria     (FEW)  /hpf


 


Urine Mucus     (FEW)  /hpf














  07/30/17 Range/Units





  14:38 


 


WBC   (3.98-10.04)  K/mm3


 


RBC   (3.98-5.22)  M/mm3


 


Hgb   (11.2-15.7)  gm/L


 


Hct   (34.1-44.9)  %


 


MCV   (79.4-94.8)  fl


 


MCH   (25.6-32.2)  pg


 


MCHC   (32.2-35.5)  g/dl


 


RDW Std Deviation   (36.4-46.3)  fL


 


Plt Count   (182-369)  K/mm3


 


MPV   (9.4-12.3)  fl


 


Neut % (Auto)   (34.0-71.1)  %


 


Lymph % (Auto)   (19.3-51.7)  %


 


Mono % (Auto)   (4.7-12.5)  %


 


Eos % (Auto)   (0.7-5.8)  


 


Baso % (Auto)   (0.1-1.2)  %


 


Neut # (Auto)   (1.56-6.13)  K/mm3


 


Lymph # (Auto)   (1.18-3.74)  K/mm3


 


Mono # (Auto)   (0.24-0.36)  K/mm3


 


Eos # (Auto)   (0.04-0.36)  K/mm3


 


Baso # (Auto)   (0.01-0.08)  K/mm3


 


Sodium   (136-145)  mEq/L


 


Potassium   (3.5-5.1)  mEq/L


 


Chloride   ()  mEq/L


 


Carbon Dioxide   (21-32)  mEq/L


 


Anion Gap   (5-15)  


 


BUN   (7-18)  mg/dL


 


Creatinine   (0.55-1.02)  mg/dL


 


Est Cr Clr Drug Dosing   mL/min


 


Estimated GFR (MDRD)   (>60)  mL/min


 


BUN/Creatinine Ratio   (14-18)  


 


Glucose   ()  mg/dL


 


Calcium   (8.5-10.1)  mg/dL


 


Total Bilirubin   (0.2-1.0)  mg/dL


 


AST   (15-37)  U/L


 


ALT   (14-59)  U/L


 


Alkaline Phosphatase   ()  U/L


 


Troponin I   (0.00-0.056)  ng/mL


 


C-Reactive Protein   (<1.0)  mg/dL


 


Total Protein   (6.4-8.2)  g/dl


 


Albumin   (3.4-5.0)  g/dl


 


Globulin   gm/dL


 


Albumin/Globulin Ratio   (1-2)  


 


TSH 3rd Generation   (0.358-3.74)  uIU/mL


 


Urine Color  Yellow  (Yellow)  


 


Urine Appearance  Slt cloudy H  (Clear)  


 


Urine pH  5.5  (5.0-8.0)  


 


Ur Specific Gravity  > or = 1.030  (1.005-1.030)  


 


Urine Protein  Trace H  (Negative)  


 


Urine Glucose (UA)  Negative  (Negative)  


 


Urine Ketones  Negative  (Negative)  


 


Urine Occult Blood  Negative  (Negative)  


 


Urine Nitrite  Negative  (Negative)  


 


Urine Bilirubin  Negative  (Negative)  


 


Urine Urobilinogen  0.2  (0.2-1.0)  


 


Ur Leukocyte Esterase  Negative  (Negative)  


 


Urine RBC  0-5  (0-5)  /hpf


 


Urine WBC  0-5  (0-5)  /hpf


 


Ur Epithelial Cells  0-5  (0-5)  /hpf


 


Urine Bacteria  Few  (FEW)  /hpf


 


Urine Mucus  Not seen  (FEW)  /hpf











Meds: 


Medications














Discontinued Medications














Generic Name Dose Route Start Last Admin





  Trade Name Freq  PRN Reason Stop Dose Admin


 


Sodium Chloride  1,000 mls @ 75 mls/hr  07/30/17 13:00  07/30/17 12:59





  Normal Saline  IV   75 mls/hr





  ASDIRECTED HEIDY   Administration


 


Sodium Chloride  500 mls @ 250 mls/hr  07/30/17 13:56  





  Normal Saline  IV  07/30/17 15:55  





  .BOLUS ONE   


 


Sodium Chloride  10 ml  07/30/17 12:48  07/30/17 12:59





  Saline Flush  FLUSH   10 ml





  ASDIRECTED PRN   Administration





  Keep Vein Open   














- Re-Assessments/Exams


Free Text/Narrative Re-Assessment/Exam: 








Will order a peripheral IV with normal saline 75 mils per hour. Initial labs 

and studies include CBC, chem 14, CRP, TSH, blood cultures x2, chest x-ray two-

view, UA, bladder scan, and EKG.





07/30/17 13:58 Portable chest x-ray poor quality with marked deviation to the 

right increasing the perihilar region on the right side. Does not reveal any 

acute abnormalities at this this time. Visualized portions of the lungs are 

clear.  Final interpretation pending.  Reviewed with Dr. Kimball.





07/30/17 14:01 Labs reviewed: Sodium 142, potassium 4.3, creatinine is elevated 

at 1.6, CRP less than 0.2, TSH is mildly elevated 5.201, CBC essentially normal.





UA is pending.





Troponin pending.  





07/30/17 15:33 UA was negative for infection processes. Troponin was negative. 

Patient was bradycardic upon arrival. She is on metoprolol tartrate 25 mg twice 

a day. In addition her TSH levels were mildly elevated at 5.201. Currently she 

is on levothyroxine 137 g every day. Will go ahead and increase her 

levothyroxine 150 g every day and decrease metoprolol dosing from 75-50 mg by 

mouth twice a day. Blood pressures have been normotensive with admission to the 

ED.  





Will discharge patient with instructions as documented.











Departure





- Departure


Time of Disposition: 15:50


Disposition: Home, Self-Care 01


Condition: Good


Clinical Impression: 


 Bradycardia, Confusion





Hypothyroidism


Qualifiers:


 Hypothyroidism type: unspecified Qualified Code(s): E03.9 - Hypothyroidism, 

unspecified





Alzheimer disease


Qualifiers:


 Alzheimer's disease onset: late-onset Dementia behavioral disturbance: with 

behavioral disturbance Qualified Code(s): G30.1 - Alzheimer's disease with late 

onset








- Discharge Information


Instructions:  Hypothyroidism, Bradycardia


Referrals: 


Alli Winston MD [Primary Care Provider] - 


Forms:  ED Department Discharge


Additional Instructions: 


Patient was bradycardic upon arrival. She is on metoprolol tartrate 75 mg twice 

a day. In addition her TSH levels were mildly elevated at 5.201. Currently she 

is on levothyroxine 137 g every day. Will go ahead and increase her 

levothyroxine to 150 g every day and decrease metoprolol dosing from 75-50 mg 

by mouth twice a day. Blood pressures have been normotensive with admission to 

the ED.  Metoprolol although  a good antihypertensive medication and also 

treats patient with tachy rhythms side effects of this medication have been 

documented to cause fatigue and constipation.  In addition  levothyroxine dose  

is subtherapeutic at this time.  Hypothyroidis can cause fatigue, constipation, 

as well increased confusion. Thus levothyroxine dosage was increased. Please 

see PCP in one week for reevaluation treatment. Return to ED as needed for any 

new or worsening symptoms.





- My Orders


Last 24 Hours: 


My Active Orders





07/30/17 12:47


EKG Documentation Completion [RC] STAT 


Chest 2V [CR] Stat 





07/30/17 12:48


Peripheral IV Care [RC] .AS DIRECTED 


Peripheral IV Insertion Adult [OM.PC] Stat 





07/30/17 12:55


Blood Culture x2 Reflex Set [OM.PC] Stat 





07/30/17 13:25


CULTURE BLOOD [BC] Stat 





07/30/17 13:35


CULTURE BLOOD [BC] Stat 





07/30/17 14:35


Insert Pike Catheter [Insert Urinary Catheter] [OM.PC] Q24H 


Urinary Catheter Assessment [RC] ASDIRECTED 














- Assessment/Plan


Last 24 Hours: 


My Active Orders





07/30/17 12:47


EKG Documentation Completion [RC] STAT 


Chest 2V [CR] Stat 





07/30/17 12:48


Peripheral IV Care [RC] .AS DIRECTED 


Peripheral IV Insertion Adult [OM.PC] Stat 





07/30/17 12:55


Blood Culture x2 Reflex Set [OM.PC] Stat 





07/30/17 13:25


CULTURE BLOOD [BC] Stat 





07/30/17 13:35


CULTURE BLOOD [BC] Stat 





07/30/17 14:35


Insert Pike Catheter [Insert Urinary Catheter] [OM.PC] Q24H 


Urinary Catheter Assessment [RC] ASDIRECTED